# Patient Record
Sex: FEMALE | Race: OTHER | HISPANIC OR LATINO
[De-identification: names, ages, dates, MRNs, and addresses within clinical notes are randomized per-mention and may not be internally consistent; named-entity substitution may affect disease eponyms.]

---

## 2024-05-07 ENCOUNTER — APPOINTMENT (OUTPATIENT)
Dept: ANTEPARTUM | Facility: CLINIC | Age: 24
End: 2024-05-07
Payer: SELF-PAY

## 2024-05-07 ENCOUNTER — RESULT REVIEW (OUTPATIENT)
Age: 24
End: 2024-05-07

## 2024-05-07 ENCOUNTER — TRANSCRIPTION ENCOUNTER (OUTPATIENT)
Age: 24
End: 2024-05-07

## 2024-05-07 ENCOUNTER — EMERGENCY (EMERGENCY)
Facility: HOSPITAL | Age: 24
LOS: 1 days | Discharge: STILL ADMITTED OR ADMINISTRATIVE | End: 2024-05-07
Admitting: EMERGENCY MEDICINE
Payer: SELF-PAY

## 2024-05-07 ENCOUNTER — ASOB RESULT (OUTPATIENT)
Age: 24
End: 2024-05-07

## 2024-05-07 ENCOUNTER — INPATIENT (INPATIENT)
Facility: HOSPITAL | Age: 24
LOS: 2 days | Discharge: HOME | End: 2024-05-10
Attending: SPECIALIST | Admitting: SPECIALIST
Payer: MEDICAID

## 2024-05-07 VITALS
RESPIRATION RATE: 18 BRPM | TEMPERATURE: 98 F | OXYGEN SATURATION: 98 % | DIASTOLIC BLOOD PRESSURE: 115 MMHG | HEART RATE: 115 BPM | SYSTOLIC BLOOD PRESSURE: 116 MMHG

## 2024-05-07 VITALS — RESPIRATION RATE: 16 BRPM | TEMPERATURE: 99 F

## 2024-05-07 DIAGNOSIS — Z98.891 HISTORY OF UTERINE SCAR FROM PREVIOUS SURGERY: Chronic | ICD-10-CM

## 2024-05-07 DIAGNOSIS — O26.899 OTHER SPECIFIED PREGNANCY RELATED CONDITIONS, UNSPECIFIED TRIMESTER: ICD-10-CM

## 2024-05-07 LAB
ADD ON TEST-SPECIMEN IN LAB: SIGNIFICANT CHANGE UP
ALBUMIN SERPL ELPH-MCNC: 3.6 G/DL — SIGNIFICANT CHANGE UP (ref 3.3–5)
ALP SERPL-CCNC: 177 U/L — HIGH (ref 40–120)
ALT FLD-CCNC: 17 U/L — SIGNIFICANT CHANGE UP (ref 4–33)
ANION GAP SERPL CALC-SCNC: 16 MMOL/L — HIGH (ref 7–14)
APPEARANCE UR: ABNORMAL
AST SERPL-CCNC: 38 U/L — HIGH (ref 4–32)
BACTERIA # UR AUTO: ABNORMAL /HPF
BASOPHILS # BLD AUTO: 0.02 K/UL — SIGNIFICANT CHANGE UP (ref 0–0.2)
BASOPHILS NFR BLD AUTO: 0.2 % — SIGNIFICANT CHANGE UP (ref 0–2)
BILIRUB SERPL-MCNC: 0.4 MG/DL — SIGNIFICANT CHANGE UP (ref 0.2–1.2)
BILIRUB UR-MCNC: NEGATIVE — SIGNIFICANT CHANGE UP
BLD GP AB SCN SERPL QL: NEGATIVE — SIGNIFICANT CHANGE UP
BUN SERPL-MCNC: 8 MG/DL — SIGNIFICANT CHANGE UP (ref 7–23)
CALCIUM SERPL-MCNC: 8.7 MG/DL — SIGNIFICANT CHANGE UP (ref 8.4–10.5)
CHLORIDE SERPL-SCNC: 102 MMOL/L — SIGNIFICANT CHANGE UP (ref 98–107)
CO2 SERPL-SCNC: 16 MMOL/L — LOW (ref 22–31)
COLOR SPEC: YELLOW — SIGNIFICANT CHANGE UP
CREAT ?TM UR-MCNC: 150 MG/DL — SIGNIFICANT CHANGE UP
CREAT SERPL-MCNC: 0.44 MG/DL — LOW (ref 0.5–1.3)
DIFF PNL FLD: NEGATIVE — SIGNIFICANT CHANGE UP
EGFR: 139 ML/MIN/1.73M2 — SIGNIFICANT CHANGE UP
EOSINOPHIL # BLD AUTO: 0.09 K/UL — SIGNIFICANT CHANGE UP (ref 0–0.5)
EOSINOPHIL NFR BLD AUTO: 0.8 % — SIGNIFICANT CHANGE UP (ref 0–6)
GLUCOSE SERPL-MCNC: 159 MG/DL — HIGH (ref 70–99)
GLUCOSE UR QL: 100 MG/DL
HCT VFR BLD CALC: 33 % — LOW (ref 34.5–45)
HCV AB S/CO SERPL IA: 0.17 S/CO — SIGNIFICANT CHANGE UP (ref 0–0.99)
HCV AB SERPL-IMP: SIGNIFICANT CHANGE UP
HGB BLD-MCNC: 10.9 G/DL — LOW (ref 11.5–15.5)
HIV 1+2 AB+HIV1 P24 AG SERPL QL IA: SIGNIFICANT CHANGE UP
IANC: 8.13 K/UL — HIGH (ref 1.8–7.4)
IMM GRANULOCYTES NFR BLD AUTO: 0.5 % — SIGNIFICANT CHANGE UP (ref 0–0.9)
KETONES UR-MCNC: 15 MG/DL
LEUKOCYTE ESTERASE UR-ACNC: NEGATIVE — SIGNIFICANT CHANGE UP
LYMPHOCYTES # BLD AUTO: 2.63 K/UL — SIGNIFICANT CHANGE UP (ref 1–3.3)
LYMPHOCYTES # BLD AUTO: 22.6 % — SIGNIFICANT CHANGE UP (ref 13–44)
MAGNESIUM SERPL-MCNC: 3.6 MG/DL — HIGH (ref 1.6–2.6)
MCHC RBC-ENTMCNC: 25.1 PG — LOW (ref 27–34)
MCHC RBC-ENTMCNC: 33 GM/DL — SIGNIFICANT CHANGE UP (ref 32–36)
MCV RBC AUTO: 76 FL — LOW (ref 80–100)
MONOCYTES # BLD AUTO: 0.72 K/UL — SIGNIFICANT CHANGE UP (ref 0–0.9)
MONOCYTES NFR BLD AUTO: 6.2 % — SIGNIFICANT CHANGE UP (ref 2–14)
NEUTROPHILS # BLD AUTO: 8.13 K/UL — HIGH (ref 1.8–7.4)
NEUTROPHILS NFR BLD AUTO: 69.7 % — SIGNIFICANT CHANGE UP (ref 43–77)
NITRITE UR-MCNC: NEGATIVE — SIGNIFICANT CHANGE UP
NRBC # BLD: 0 /100 WBCS — SIGNIFICANT CHANGE UP (ref 0–0)
NRBC # FLD: 0 K/UL — SIGNIFICANT CHANGE UP (ref 0–0)
PH UR: 6 — SIGNIFICANT CHANGE UP (ref 5–8)
PLATELET # BLD AUTO: 199 K/UL — SIGNIFICANT CHANGE UP (ref 150–400)
POTASSIUM SERPL-MCNC: 4.1 MMOL/L — SIGNIFICANT CHANGE UP (ref 3.5–5.3)
POTASSIUM SERPL-SCNC: 4.1 MMOL/L — SIGNIFICANT CHANGE UP (ref 3.5–5.3)
PROT ?TM UR-MCNC: 117 MG/DL — SIGNIFICANT CHANGE UP
PROT SERPL-MCNC: 7.6 G/DL — SIGNIFICANT CHANGE UP (ref 6–8.3)
PROT UR-MCNC: 100 MG/DL
PROT/CREAT UR-RTO: 0.8 RATIO — HIGH (ref 0–0.2)
RBC # BLD: 4.34 M/UL — SIGNIFICANT CHANGE UP (ref 3.8–5.2)
RBC # FLD: 14.8 % — HIGH (ref 10.3–14.5)
RBC CASTS # UR COMP ASSIST: 1 /HPF — SIGNIFICANT CHANGE UP (ref 0–4)
RH IG SCN BLD-IMP: POSITIVE — SIGNIFICANT CHANGE UP
RH IG SCN BLD-IMP: POSITIVE — SIGNIFICANT CHANGE UP
SODIUM SERPL-SCNC: 134 MMOL/L — LOW (ref 135–145)
SP GR SPEC: 1.03 — SIGNIFICANT CHANGE UP (ref 1–1.03)
SQUAMOUS # UR AUTO: 8 /HPF — HIGH (ref 0–5)
T PALLIDUM AB TITR SER: NEGATIVE — SIGNIFICANT CHANGE UP
URATE SERPL-MCNC: 4.7 MG/DL — SIGNIFICANT CHANGE UP (ref 2.5–7)
UROBILINOGEN FLD QL: 1 MG/DL — SIGNIFICANT CHANGE UP (ref 0.2–1)
WBC # BLD: 11.65 K/UL — HIGH (ref 3.8–10.5)
WBC # FLD AUTO: 11.65 K/UL — HIGH (ref 3.8–10.5)
WBC UR QL: 4 /HPF — SIGNIFICANT CHANGE UP (ref 0–5)

## 2024-05-07 PROCEDURE — 99202 OFFICE O/P NEW SF 15 MIN: CPT | Mod: 25

## 2024-05-07 PROCEDURE — 59025 FETAL NON-STRESS TEST: CPT | Mod: 26,59

## 2024-05-07 PROCEDURE — 99253 IP/OBS CNSLTJ NEW/EST LOW 45: CPT | Mod: 25

## 2024-05-07 PROCEDURE — L9996: CPT

## 2024-05-07 PROCEDURE — 59514 CESAREAN DELIVERY ONLY: CPT | Mod: U7,GC

## 2024-05-07 PROCEDURE — 88307 TISSUE EXAM BY PATHOLOGIST: CPT | Mod: 26

## 2024-05-07 PROCEDURE — 99221 1ST HOSP IP/OBS SF/LOW 40: CPT

## 2024-05-07 PROCEDURE — 76805 OB US >/= 14 WKS SNGL FETUS: CPT | Mod: 26

## 2024-05-07 PROCEDURE — 76819 FETAL BIOPHYS PROFIL W/O NST: CPT | Mod: 26

## 2024-05-07 RX ORDER — INFLUENZA VIRUS VACCINE 15; 15; 15; 15 UG/.5ML; UG/.5ML; UG/.5ML; UG/.5ML
0.5 SUSPENSION INTRAMUSCULAR ONCE
Refills: 0 | Status: DISCONTINUED | OUTPATIENT
Start: 2024-05-07 | End: 2024-05-10

## 2024-05-07 RX ORDER — CITRIC ACID/SODIUM CITRATE 300-500 MG
30 SOLUTION, ORAL ORAL ONCE
Refills: 0 | Status: COMPLETED | OUTPATIENT
Start: 2024-05-07 | End: 2024-05-07

## 2024-05-07 RX ORDER — MAGNESIUM HYDROXIDE 400 MG/1
30 TABLET, CHEWABLE ORAL
Refills: 0 | Status: DISCONTINUED | OUTPATIENT
Start: 2024-05-07 | End: 2024-05-10

## 2024-05-07 RX ORDER — ACETAMINOPHEN 500 MG
975 TABLET ORAL
Refills: 0 | Status: DISCONTINUED | OUTPATIENT
Start: 2024-05-07 | End: 2024-05-10

## 2024-05-07 RX ORDER — SIMETHICONE 80 MG/1
80 TABLET, CHEWABLE ORAL EVERY 4 HOURS
Refills: 0 | Status: DISCONTINUED | OUTPATIENT
Start: 2024-05-07 | End: 2024-05-10

## 2024-05-07 RX ORDER — MAGNESIUM SULFATE 500 MG/ML
4 VIAL (ML) INJECTION ONCE
Refills: 0 | Status: COMPLETED | OUTPATIENT
Start: 2024-05-07 | End: 2024-05-07

## 2024-05-07 RX ORDER — LANOLIN
1 OINTMENT (GRAM) TOPICAL EVERY 6 HOURS
Refills: 0 | Status: DISCONTINUED | OUTPATIENT
Start: 2024-05-07 | End: 2024-05-10

## 2024-05-07 RX ORDER — CHLORHEXIDINE GLUCONATE 213 G/1000ML
1 SOLUTION TOPICAL DAILY
Refills: 0 | Status: DISCONTINUED | OUTPATIENT
Start: 2024-05-07 | End: 2024-05-07

## 2024-05-07 RX ORDER — DIPHENHYDRAMINE HCL 50 MG
25 CAPSULE ORAL EVERY 6 HOURS
Refills: 0 | Status: DISCONTINUED | OUTPATIENT
Start: 2024-05-07 | End: 2024-05-10

## 2024-05-07 RX ORDER — MAGNESIUM SULFATE 500 MG/ML
2 VIAL (ML) INJECTION
Qty: 40 | Refills: 0 | Status: DISCONTINUED | OUTPATIENT
Start: 2024-05-07 | End: 2024-05-08

## 2024-05-07 RX ORDER — KETOROLAC TROMETHAMINE 30 MG/ML
30 SYRINGE (ML) INJECTION EVERY 6 HOURS
Refills: 0 | Status: DISCONTINUED | OUTPATIENT
Start: 2024-05-07 | End: 2024-05-08

## 2024-05-07 RX ORDER — SODIUM CHLORIDE 9 MG/ML
1000 INJECTION, SOLUTION INTRAVENOUS
Refills: 0 | Status: DISCONTINUED | OUTPATIENT
Start: 2024-05-07 | End: 2024-05-08

## 2024-05-07 RX ORDER — HEPARIN SODIUM 5000 [USP'U]/ML
5000 INJECTION INTRAVENOUS; SUBCUTANEOUS EVERY 12 HOURS
Refills: 0 | Status: DISCONTINUED | OUTPATIENT
Start: 2024-05-07 | End: 2024-05-10

## 2024-05-07 RX ORDER — FAMOTIDINE 10 MG/ML
20 INJECTION INTRAVENOUS ONCE
Refills: 0 | Status: COMPLETED | OUTPATIENT
Start: 2024-05-07 | End: 2024-05-07

## 2024-05-07 RX ORDER — ACETAMINOPHEN 500 MG
1000 TABLET ORAL ONCE
Refills: 0 | Status: DISCONTINUED | OUTPATIENT
Start: 2024-05-07 | End: 2024-05-10

## 2024-05-07 RX ORDER — ACETAMINOPHEN 500 MG
1000 TABLET ORAL ONCE
Refills: 0 | Status: COMPLETED | OUTPATIENT
Start: 2024-05-07 | End: 2024-05-07

## 2024-05-07 RX ORDER — OXYTOCIN 10 UNIT/ML
VIAL (ML) INJECTION
Qty: 20 | Refills: 0 | Status: COMPLETED | OUTPATIENT
Start: 2024-05-07 | End: 2024-05-07

## 2024-05-07 RX ORDER — OXYCODONE HYDROCHLORIDE 5 MG/1
5 TABLET ORAL ONCE
Refills: 0 | Status: DISCONTINUED | OUTPATIENT
Start: 2024-05-07 | End: 2024-05-10

## 2024-05-07 RX ORDER — IBUPROFEN 200 MG
600 TABLET ORAL EVERY 6 HOURS
Refills: 0 | Status: COMPLETED | OUTPATIENT
Start: 2024-05-07 | End: 2025-04-05

## 2024-05-07 RX ORDER — TETANUS TOXOID, REDUCED DIPHTHERIA TOXOID AND ACELLULAR PERTUSSIS VACCINE, ADSORBED 5; 2.5; 8; 8; 2.5 [IU]/.5ML; [IU]/.5ML; UG/.5ML; UG/.5ML; UG/.5ML
0.5 SUSPENSION INTRAMUSCULAR ONCE
Refills: 0 | Status: DISCONTINUED | OUTPATIENT
Start: 2024-05-07 | End: 2024-05-10

## 2024-05-07 RX ORDER — NIFEDIPINE 30 MG
30 TABLET, EXTENDED RELEASE 24 HR ORAL ONCE
Refills: 0 | Status: COMPLETED | OUTPATIENT
Start: 2024-05-07 | End: 2024-05-07

## 2024-05-07 RX ORDER — OXYTOCIN 10 UNIT/ML
16.67 VIAL (ML) INJECTION
Qty: 20 | Refills: 0 | Status: DISCONTINUED | OUTPATIENT
Start: 2024-05-07 | End: 2024-05-08

## 2024-05-07 RX ORDER — OXYCODONE HYDROCHLORIDE 5 MG/1
5 TABLET ORAL
Refills: 0 | Status: DISCONTINUED | OUTPATIENT
Start: 2024-05-07 | End: 2024-05-10

## 2024-05-07 RX ORDER — SODIUM CHLORIDE 9 MG/ML
1000 INJECTION, SOLUTION INTRAVENOUS
Refills: 0 | Status: DISCONTINUED | OUTPATIENT
Start: 2024-05-07 | End: 2024-05-07

## 2024-05-07 RX ORDER — LABETALOL HCL 100 MG
20 TABLET ORAL ONCE
Refills: 0 | Status: COMPLETED | OUTPATIENT
Start: 2024-05-07 | End: 2024-05-07

## 2024-05-07 RX ADMIN — Medication 50 MILLIUNIT(S)/MIN: at 21:21

## 2024-05-07 RX ADMIN — CHLORHEXIDINE GLUCONATE 1 APPLICATION(S): 213 SOLUTION TOPICAL at 13:38

## 2024-05-07 RX ADMIN — FAMOTIDINE 20 MILLIGRAM(S): 10 INJECTION INTRAVENOUS at 17:21

## 2024-05-07 RX ADMIN — Medication 30 MILLILITER(S): at 17:21

## 2024-05-07 RX ADMIN — SODIUM CHLORIDE 1000 MILLILITER(S): 9 INJECTION, SOLUTION INTRAVENOUS at 12:05

## 2024-05-07 RX ADMIN — Medication 20 MILLIGRAM(S): at 13:07

## 2024-05-07 RX ADMIN — Medication 50 GM/HR: at 19:29

## 2024-05-07 RX ADMIN — Medication 50 GM/HR: at 13:31

## 2024-05-07 RX ADMIN — Medication 300 GRAM(S): at 13:08

## 2024-05-07 RX ADMIN — Medication 50 GM/HR: at 22:26

## 2024-05-07 RX ADMIN — Medication 30 MILLIGRAM(S): at 14:31

## 2024-05-07 RX ADMIN — Medication 400 MILLIGRAM(S): at 21:21

## 2024-05-07 NOTE — OB RN PATIENT PROFILE - COMFORT/ACCEPTABLE PAIN LEVEL (0-10)
How Severe Is Your Skin Lesion?: mild
Has Your Skin Lesion Been Treated?: not been treated
Is This A New Presentation, Or A Follow-Up?: Skin Lesion
6

## 2024-05-07 NOTE — CHART NOTE - NSCHARTNOTEFT_GEN_A_CORE
OB     Assuming care of patient. P1 (Portuguese speaking) admitted with PIH/ Given severe preeclampsia at 36 weeks decision made to proceed with delivery. H/o C/S x1.     Plan  -continue magnesium for seizure ppx  -consent obtained for repeat C/S  -anesthesia and RN teams aware    N Khari-MD [place]

## 2024-05-07 NOTE — OB PROVIDER TRIAGE NOTE - NSOBPROVIDERNOTE_OBGYN_ALL_OB_FT
D/w Dr Lovell, Dr Boo [facility] labs, T&S, RPR, Hep C, expedited labs sent  ATU sonogram to determine EDC D/w Dr Lovell, Dr Haddad  [facility] labs, T&S, RPR, Hep C, expedited labs, A1C, urine toxicology sent  ATU sonogram to determine EDC    1200  ATU sonographer at bedside    1235  Dr Hazel, Dr Kennedy, Dr Haddad at bedside    This is a 24 year old  at 36.6 weeks gestational age admitted for sPEC    Plan discussed with Dr Escalante, Dr Haddad   Plan discussed with MFM- pt for rpt c/s  Labetalol 20 mg IVP given at 1305 for 2 severe range BPs  Procardia 30 mg XL q 24 hours  Accucheck 114 @ 1302  Magnesium Sulfate for seizure precautions   Huddle done with Dr Escalante, Dr Haddad, Anesthesia and charge RN- plan to await NPO status unless change in clinical status     Risks, benefits, alternatives, and possible complications have been discussed in detail with the patient in her native language. Pre-admission, admission, and post admission procedures and expectations were discussed in detail. All questions answered, all appropriate hospital consents were signed.     Informed consent was obtained. The following was discussed:    -  section

## 2024-05-07 NOTE — OB PROVIDER TRIAGE NOTE - NSHPPHYSICALEXAM_GEN_ALL_CORE
Vital Signs Last 24 Hrs  T(C): 37 (07 May 2024 11:36), Max: 37.0 (07 May 2024 11:26)  T(F): 98.6 (07 May 2024 11:36), Max: 98.6 (07 May 2024 11:26)  HR: 96 (07 May 2024 12:00) (96 - 126)  BP: 132/74 (07 May 2024 12:00) (116/115 - 160/97)  BP(mean): --  RR: 18 (07 May 2024 11:36) (16 - 18)  SpO2: 98% (07 May 2024 10:24) (98% - 98%)    A7O x3  CTAB  Abdomen: gravid, soft, nontender Vital Signs Last 24 Hrs  T(C): 37 (07 May 2024 11:36), Max: 37.0 (07 May 2024 11:26)  T(F): 98.6 (07 May 2024 11:36), Max: 98.6 (07 May 2024 11:26)  HR: 96 (07 May 2024 12:00) (96 - 126)  BP: 132/74 (07 May 2024 12:00) (116/115 - 160/97)  BP(mean): --  RR: 18 (07 May 2024 11:36) (16 - 18)  SpO2: 98% (07 May 2024 10:24) (98% - 98%)    A&O x3  CTAB  Abdomen: gravid, soft, nontender, no rebound, no guarding  TAS: by [facility]/ ATU sonographer  vtx, anterior placenta, KALI 30, edema noted on face, arms and chest, efw 90%, AC > 99% efw 3635g 8 lbs BPP 8/8, images saved in ASOB  NST: 145 baseline, minimal to moderate variability + accels, no decels, mild contractions q2-5 minutes pt does not feel, reactive NST  SVE- deferred  SSE- deferred

## 2024-05-07 NOTE — OB RN DELIVERY SUMMARY - NS_SEPSISRSKCALC_OBGYN_ALL_OB_FT
EOS calculated successfully. EOS Risk Factor: 0.5/1000 live births ([facility] national incidence); GA=36w6d; Temp=98.6; ROM=0.05; GBS='Unknown'; Antibiotics='No antibiotics or any antibiotics < 2 hrs prior to birth'

## 2024-05-07 NOTE — OB PROVIDER H&P - NSHPPHYSICALEXAM_GEN_ALL_CORE
Vital Signs Last 24 Hrs  T(C): 37 (07 May 2024 11:36), Max: 37.0 (07 May 2024 11:26)  T(F): 98.6 (07 May 2024 11:36), Max: 98.6 (07 May 2024 11:26)  HR: 96 (07 May 2024 12:00) (96 - 126)  BP: 132/74 (07 May 2024 12:00) (116/115 - 160/97)  BP(mean): --  RR: 18 (07 May 2024 11:36) (16 - 18)  SpO2: 98% (07 May 2024 10:24) (98% - 98%)    A&O x3  CTAB  Abdomen: gravid, soft, nontender, no rebound, no guarding  TAS: by [facility]/ ATU sonographer  vtx, anterior placenta, KALI 30, edema noted on face, arms and chest, efw 90%, AC > 99% efw 3635g 8 lbs BPP 8/8, images saved in ASOB  NST: 145 baseline, minimal to moderate variability + accels, no decels, mild contractions q2-5 minutes pt does not feel, reactive NST  SVE- deferred  SSE- deferred

## 2024-05-07 NOTE — OB RN INTRAOPERATIVE NOTE - NS_DRAINS_OBGYN_ALL_OB
Jelena from [employer] called to notify us that Jose's Briviact was returned to sender. They got a notification that he did miss a dose. They are resending it to him.      Jelena (Pharmacist)   [phone]  Ref #: [id number]     
No

## 2024-05-07 NOTE — OB PROVIDER H&P - ASSESSMENT
D/w Dr Lovell, Dr aHddad  [facility] labs, T&S, RPR, Hep C, expedited labs, A1C, urine toxicology sent  ATU sonogram to determine EDC    1200  ATU sonographer at bedside    1235  Dr Hazel, Dr Kennedy, Dr Haddad at bedside    This is a 24 year old  at 36.6 weeks gestational age admitted for sPEC    Plan discussed with Dr Escalante, Dr Haddad   Plan discussed with MFM- pt for rpt c/s  Labetalol 20 mg IVP given at 1305 for 2 severe range BPs  Procardia 30 mg XL q 24 hours  Accucheck 114 @ 1302  Magnesium Sulfate for seizure precautions   2nd IV placed for risk of PPH  2 units T&C on hold     Huddle done with Dr Escalante, Dr Haddad, Anesthesia and charge RN- plan to await NPO status unless change in clinical status     Risks, benefits, alternatives, and possible complications have been discussed in detail with the patient in her native language. Pre-admission, admission, and post admission procedures and expectations were discussed in detail. All questions answered, all appropriate hospital consents were signed.     Informed consent was obtained. The following was discussed:    -  section

## 2024-05-07 NOTE — OB RN INTRAOPERATIVE NOTE - NSSELHIDDEN_OBGYN_ALL_OB_FT
[NS_DeliveryAttending1_OBGYN_ALL_OB_FT:[id number]],[NS_DeliveryAssist1_OBGYN_ALL_OB_FT:[id number]],[NS_DeliveryRN_OBGYN_ALL_OB_FT:[id number]]

## 2024-05-07 NOTE — CHART NOTE - NSCHARTNOTEFT_GEN_A_CORE
MFM ATTENDING    Called to bedside by sonographer for concern for skin edema on sono.     23yo  at 36w6d (see dating below) came to triage to "check on the baby." States she found out she was pregnant 3 weeks ago, had a sono at that time which gave her a due date of 2024, and [place] the [place] ago.     denies headache / blurry vision / scotomata / ruq / epig pain.   denies vb/lof/ctx, reports normal fetal movements.     Issues:  1. dating - EBONI 2024 by patient report, which she says is based on a sono 3 weeks ago. Records not available for review.   2. h/o c/s x1 at 37 weeks in setting of preeclampsia and diabetes  3. no prenatal care  4. elevated BP with multiple in severe range though not yet received immediate release anti-hypertensives  5.  in ED - checked appx 1-2 hours after eating. No known dx of GDM or pregestational diabetes. A1c pending.   6. h/o preeclampsia - delivered at 37w in prior pregnancy  7. h/o macrosomia in prior pregnancy - baby 9lb at 37 weeks per patient report.     BPs 130s-160s/90s-110s  Comfortable  Abd obese soft gravid nontender no rebound / guarding  ATU scan: cephalic, EFW 3635g (95%) AC >99%, KALI 30cm.     FHT: 150 / minimal / no accels / no decels --> 145 / moderate / + accels / no decels  [place]: ctx q1-4 minutes        hct 33  plt 199  creatinine 0.44  ast/alt 38/17  whole blood glucose: 159    HBsAg, HCV, HIV, rubella IgG, syphilis screen, T&S, utox pending.   UA: 100 protein    A1c pending    A/P: 23yo  at 36w6d with preeclampsia with severe features  1. fetus - initially appeared category 2 but later variability improved with accels. would keep on continuous monitoring. EFW 3635g, cephalic.     2. PEC with severe features - delivery is indicated. she is not well dated but she is 36w6d by stated EBONI from sono several weeks ago [place], and is measuring 37w3d on ATU scan today.   bp's normal to intermittently severe range. asymptomatic. labs mostly wnl except AST 38.   Would initiate nifedipine 30mg XL, magnesium, and delivery is indicated. patient has a prior  x1 and desires repeat . Ate juice and cake or biscuit at appx 8:30am. NPO status for 8 hours would be at appx 4:30pm, however would take for repeat  earlier if tracing remains with minimal variability or has decelerations.     3. likely dm - no clear diagnosis however given her history of a prior 37 week delivery of a 9lb baby with diabetes in that pregnancy, and now with finding of skin edema on sono and FSG of 179, suspect she has undiagnosed diabetes this pregnancy as well. No evidence of cardiomegaly or hydrops, denies recent illness. No obvious structural anomalies though scan is limited by gestational age and fetal position.   follow up fsg in 4 hours from last one. drip or subcutaneous insulin prn for glucose control.     All questions answered to patients satisfaction.     Discussed with  Dr. Escalante. MFM ATTENDING    Called to bedside by sonographer for concern for skin edema on sono.     25yo  at 36w6d (see dating below) came to triage to "check on the baby." States she found out she was pregnant 3 weeks ago, had a sono at that time which gave her a due date of 2024, and [place].     denies headache / blurry vision / scotomata / ruq / epig pain.   denies vb/lof/ctx, reports normal fetal movements.     Issues:  1. dating - EBONI 2024 by patient report, which she says is based on a sono 3 weeks ago. Records not available for review.   2. h/o c/s x1 at 37 weeks in setting of preeclampsia and diabetes  3. no prenatal care  4. elevated BP with multiple in severe range though not yet received immediate release anti-hypertensives  5.  in ED - checked appx 1-2 hours after eating. No known dx of GDM or pregestational diabetes. A1c pending.   6. h/o preeclampsia - delivered at 37w in prior pregnancy  7. h/o macrosomia in prior pregnancy - baby 9lb at 37 weeks per patient report.     BPs 130s-160s/90s-110s  Comfortable  Abd obese soft gravid nontender no rebound / guarding    SVE (triage staff): Closed / long / high    ATU scan: cephalic, EFW 3635g (95%) AC >99%, KALI 30cm.     FHT: 150 / minimal / no accels / no decels --> 145 / moderate / + accels / no decels  [place]: ctx q1-4 minutes        hct 33  plt 199  creatinine 0.44  ast/alt 38/17  whole blood glucose: 159    HBsAg, HCV, HIV, rubella IgG, syphilis screen, T&S, utox pending.   UA: 100 protein    A1c pending    A/P: 25yo  at 36w6d with preeclampsia with severe features  1. fetus - initially appeared category 2 but later variability improved with accels. would keep on continuous monitoring. EFW 3635g, cephalic.     2. PEC with severe features - delivery is indicated. she is not well dated but she is 36w6d by stated EBONI from sono several weeks ago [place], and is measuring 37w3d on ATU scan today.   bp's normal to intermittently severe range. asymptomatic. labs mostly wnl except AST 38.   Would initiate nifedipine 30mg XL, magnesium, and delivery is indicated. patient has a prior  x1 and desires repeat . Ate juice and cake or biscuit at appx 8:30am. NPO status for 8 hours would be at appx 4:30pm, however would take for repeat  earlier if tracing remains with minimal variability or has decelerations.     3. likely dm - no clear diagnosis however given her history of a prior 37 week delivery of a 9lb baby with diabetes in that pregnancy, and now with finding of skin edema on sono and FSG of 179, suspect she has undiagnosed diabetes this pregnancy as well. No evidence of cardiomegaly or hydrops, denies recent illness. No obvious structural anomalies though scan is limited by gestational age and fetal position.   follow up fsg in 4 hours from last one. drip or subcutaneous insulin prn for glucose control.     All questions answered to patients satisfaction.     Discussed with  Dr. Escalante.

## 2024-05-07 NOTE — OB NEONATOLOGY/PEDIATRICIAN DELIVERY SUMMARY - BABY A: APGAR 1 MIN MUSCLE TONE, DELIVERY
-- DO NOT REPLY / DO NOT REPLY ALL --  -- Message is from [employer] [facility] [employer] ([facility]) --    General Patient Message: Patient called stating multiple prescriptions for her atorvastatin was sent to [facility]. She would need all of the excess prescriptions canceled and leave one prescription for the 90 day refill active. Please assist.       Alternative phone number: [phone]     Can a detailed message be left? Yes    Message Turnaround: [place]:    Refer to site's KB page for routing instructions    Please give this turnaround time to the caller:   \"You can expect to receive a response 2-3 business days after your provider's clinical team reviews the message\"               (1) flexion of extremities

## 2024-05-07 NOTE — OB PROVIDER TRIAGE NOTE - HISTORY OF PRESENT ILLNESS
ID [id number] Yeyo    This is a 24 year old  at (?)36.6 weeks- pt states she found out she was pregnant 3 weeks ago in the [place] and was told at first sonogram/ OB appointment at that time that the EDC was  or " early ". Pt states last period was in 2023. Pt recently arrived in [place] (5/3) and presented to ED today with complaints of " feeling off and weak" and acid reflux. Pt denies headache, blurry vision, epigastric pain, nausea, vomiting or swelling. Pt denies elevated BPs at initial OB appointment. Pt reports +GFM, denies LOF, VB or contractions.     PNC: x 1 visit in [place] 3 weeks ago    AP course:  - previous c/s 2022 for PEC and GDM/LGA     HIE reviewed   no previous ultrasounds available for review   ID [id number] Yeyo    This is a 24 year old  at (questionable ?)36.6 weeks- pt states she found out she was pregnant 3 weeks ago in the [place] and was told at first sonogram/ OB appointment at that time that the EDC was  or " early ". Pt states last period was in 2023- does not remember exact date. Pt recently arrived in [place] (5/3) and presented to ED today with complaints of " feeling off and weak" and acid reflux. Pt denies headache, blurry vision, epigastric pain, nausea, vomiting or swelling. Pt denies elevated BPs at initial OB appointment. Pt reports +GFM, denies LOF, VB or contractions.     As per telephone report from ED RN at 10:23 BP was 166/115, 144/91 on quick-look assessment.    PNC: x 1 visit in [place] 3 weeks ago    AP course:  - previous c/s 2022 for PEC and GDM/LGA     HIE reviewed   no previous ultrasounds available for review

## 2024-05-07 NOTE — OB RN DELIVERY SUMMARY - NSSELHIDDEN_OBGYN_ALL_OB_FT
[NS_DeliveryAttending1_OBGYN_ALL_OB_FT:[id number]],[NS_DeliveryAssist1_OBGYN_ALL_OB_FT:[id number]],[NS_DeliveryRN_OBGYN_ALL_OB_FT:[id number]]

## 2024-05-07 NOTE — ED ADULT TRIAGE NOTE - CHIEF COMPLAINT QUOTE
approx. 9 months pregnant, wants evaluation, from the DR, +fetal movement, no bleeding/abdominal pain, noted to be hypertensive hx of preeclampsia and gestational diabetes

## 2024-05-07 NOTE — OB PROVIDER TRIAGE NOTE - NSHPLABSRESULTS_GEN_ALL_CORE
10.9   11.65 )-----------( 199      ( 07 May 2024 11:40 )             33.0         134<L>  |  102  |  8   ----------------------------<  159<H>  4.1   |  16<L>  |  0.44<L>    Ca    8.7      07 May 2024 11:40    TPro  7.6  /  Alb  3.6  /  TBili  0.4  /  DBili  x   /  AST  38<H>  /  ALT  17  /  AlkPhos  177<H>        Urinalysis Basic - ( 07 May 2024 11:40 )    Color: Yellow / Appearance: Cloudy / S.026 / pH: x  Gluc: 159 mg/dL / Ketone: 15 mg/dL  / Bili: Negative / Urobili: 1.0 mg/dL   Blood: x / Protein: 100 mg/dL / Nitrite: Negative   Leuk Esterase: Negative / RBC: 1 /HPF / WBC 4 /HPF   Sq Epi: x / Non Sq Epi: 8 /HPF / Bacteria: Many /HPF    PC ratio 0.8

## 2024-05-07 NOTE — OB PROVIDER H&P - HISTORY OF PRESENT ILLNESS
ID [id number] Yeyo    This is a 24 year old  at (questionable GA) 36.6 weeks- pt states she found out she was pregnant 3 weeks ago in the [place] and was told at first sonogram/ OB appointment at that time that the EDC was  or " early ". Pt states last period was in 2023- does not remember exact date. Pt recently arrived in [place] (5[place]3) and presented to ED today with complaints of " feeling off and weak" and acid reflux. Pt denies headache, blurry vision, epigastric pain, nausea, vomiting or swelling. Pt denies elevated BPs at initial OB appointment. Pt reports +GFM, denies LOF, VB or contractions.     As per telephone report from ED RN at 10:23 BP was 166/115, 144/91 on quick-look assessment.    PNC: x 1 visit in [place] 3 weeks ago    AP course:  - previous c/s 2022 for PEC and GDM/LGA     HIE reviewed   no previous ultrasounds available for review

## 2024-05-07 NOTE — OB RN PATIENT PROFILE - FALL HARM RISK - UNIVERSAL INTERVENTIONS
Bed in lowest position, wheels locked, appropriate side rails in place/Call bell, personal items and telephone in reach/Instruct patient to call for assistance before getting out of bed or chair/Non-slip footwear when patient is out of bed/[place] to call system/Physically safe environment - no spills, clutter or unnecessary equipment/Purposeful Proactive Rounding/Room/bathroom lighting operational, light cord in reach

## 2024-05-07 NOTE — OB RN PATIENT PROFILE - NS_PRENATALLABSOURCEGBS1PN_OBGYN_ALL_OB
called to say they received another shipment of antibiotics and yesterday Dr Beal said they were discontinuing that. Also he said there is an error on patient's AVS. He is asking to be called back.   unknown

## 2024-05-07 NOTE — OB PROVIDER DELIVERY SUMMARY - NSPROVIDERDELIVERYNOTE_OBGYN_ALL_OB_FT
rLTCS @36w6d for sPEC  Viable female infant, apgars 8/9, weight 3670g  Hysterotomy closed in 1 layer using caprosyn  Grossly normal uterus, tubes, and ovaries  Abdomen closed in standard fashion  Pt and infant to recovery in stable condition  Magnesium paused for  section. Resume in PACU.  QBL: 216   IVF: 2200    UOP: 400 repeat vacuum assisted LTCS @36w6d for sPEC  Viable female infant, apgars 8/9, weight 3670g  Hysterotomy closed in 1 layer using caprosyn  Grossly normal uterus, tubes, and ovaries  Abdomen closed in standard fashion  Pt and infant to recovery in stable condition  Magnesium paused for  section. To resume in PACU.  QBL: 216   IVF: 2200    UOP: 400

## 2024-05-07 NOTE — OB NEONATOLOGY/PEDIATRICIAN DELIVERY SUMMARY - NS_BIRTHTRAUMADETAILSA_OBGYN_ALL_OB_FT
Peds called to OR for no prenatal care. 36+6 wk LGA female born via rCS to a  22y/o  mother.  Prenatal history significant for GDMA1 no prenatal care and no prenatal labs. No other maternal labs or history. Mother denies feelin or being sick during pregnancy. Maternal labs include Blood Type A+ , HIV - , RPR PEND , Rubella PEND , Hep B PEND , GBS unknown. AROM at delivery with clear fluids. Baby emerged vigorous, crying, was warmed, dried suctioned and stimulated with APGARS of 8/9 . Resuscitation included: deep sutioning, stim, CPAP 5/35% weaned to CPAP 5/21% for 20 min starting at 5 MOL. Mom plans to initiate breastfeeding, declines Hep B vaccine.  Highest maternal temp: 36.8  . EOS0.06 .    BW 3670g

## 2024-05-07 NOTE — OB PROVIDER DELIVERY SUMMARY - NSSELHIDDEN_OBGYN_ALL_OB_FT
[NS_DeliveryAttending1_OBGYN_ALL_OB_FT:[id number]==],[NS_DeliveryAssist1_OBGYN_ALL_OB_FT:[id number]],[NS_DeliveryRN_OBGYN_ALL_OB_FT:[email or web address]]

## 2024-05-07 NOTE — OB RN DELIVERY SUMMARY - NS_BABYDISPOA_OBGYN_ALL_OB
Prescription for Ranexa 50 mg twice a day refused due to dose adjustment already sent   Mother's Bedside/Non-

## 2024-05-08 ENCOUNTER — TRANSCRIPTION ENCOUNTER (OUTPATIENT)
Age: 24
End: 2024-05-08

## 2024-05-08 DIAGNOSIS — E78.5 HYPERLIPIDEMIA, UNSPECIFIED: ICD-10-CM

## 2024-05-08 DIAGNOSIS — E11.9 TYPE 2 DIABETES MELLITUS WITHOUT COMPLICATIONS: ICD-10-CM

## 2024-05-08 DIAGNOSIS — O24.419 GESTATIONAL DIABETES MELLITUS IN PREGNANCY, UNSPECIFIED CONTROL: ICD-10-CM

## 2024-05-08 DIAGNOSIS — I10 ESSENTIAL (PRIMARY) HYPERTENSION: ICD-10-CM

## 2024-05-08 LAB
ALBUMIN SERPL ELPH-MCNC: 3.1 G/DL — LOW (ref 3.3–5)
ALP SERPL-CCNC: 168 U/L — HIGH (ref 40–120)
ALT FLD-CCNC: 14 U/L — SIGNIFICANT CHANGE UP (ref 4–33)
ANION GAP SERPL CALC-SCNC: 15 MMOL/L — HIGH (ref 7–14)
AST SERPL-CCNC: 37 U/L — HIGH (ref 4–32)
BASOPHILS # BLD AUTO: 0.02 K/UL — SIGNIFICANT CHANGE UP (ref 0–0.2)
BASOPHILS NFR BLD AUTO: 0.1 % — SIGNIFICANT CHANGE UP (ref 0–2)
BILIRUB SERPL-MCNC: 0.6 MG/DL — SIGNIFICANT CHANGE UP (ref 0.2–1.2)
BUN SERPL-MCNC: 7 MG/DL — SIGNIFICANT CHANGE UP (ref 7–23)
CALCIUM SERPL-MCNC: 7.7 MG/DL — LOW (ref 8.4–10.5)
CHLORIDE SERPL-SCNC: 99 MMOL/L — SIGNIFICANT CHANGE UP (ref 98–107)
CO2 SERPL-SCNC: 17 MMOL/L — LOW (ref 22–31)
CREAT SERPL-MCNC: 0.51 MG/DL — SIGNIFICANT CHANGE UP (ref 0.5–1.3)
EGFR: 134 ML/MIN/1.73M2 — SIGNIFICANT CHANGE UP
EOSINOPHIL # BLD AUTO: 0 K/UL — SIGNIFICANT CHANGE UP (ref 0–0.5)
EOSINOPHIL NFR BLD AUTO: 0 % — SIGNIFICANT CHANGE UP (ref 0–6)
GLUCOSE SERPL-MCNC: 181 MG/DL — HIGH (ref 70–99)
HBV SURFACE AG SERPL QL IA: SIGNIFICANT CHANGE UP
HCT VFR BLD CALC: 31.5 % — LOW (ref 34.5–45)
HGB BLD-MCNC: 10 G/DL — LOW (ref 11.5–15.5)
IANC: 12.29 K/UL — HIGH (ref 1.8–7.4)
IMM GRANULOCYTES NFR BLD AUTO: 0.3 % — SIGNIFICANT CHANGE UP (ref 0–0.9)
LDH SERPL L TO P-CCNC: 244 U/L — HIGH (ref 135–225)
LYMPHOCYTES # BLD AUTO: 1.67 K/UL — SIGNIFICANT CHANGE UP (ref 1–3.3)
LYMPHOCYTES # BLD AUTO: 11 % — LOW (ref 13–44)
MAGNESIUM SERPL-MCNC: 3.7 MG/DL — HIGH (ref 1.6–2.6)
MAGNESIUM SERPL-MCNC: 4.1 MG/DL — HIGH (ref 1.6–2.6)
MAGNESIUM SERPL-MCNC: 4.2 MG/DL — HIGH (ref 1.6–2.6)
MCHC RBC-ENTMCNC: 24.6 PG — LOW (ref 27–34)
MCHC RBC-ENTMCNC: 31.7 GM/DL — LOW (ref 32–36)
MCV RBC AUTO: 77.4 FL — LOW (ref 80–100)
MONOCYTES # BLD AUTO: 1.14 K/UL — HIGH (ref 0–0.9)
MONOCYTES NFR BLD AUTO: 7.5 % — SIGNIFICANT CHANGE UP (ref 2–14)
NEUTROPHILS # BLD AUTO: 12.29 K/UL — HIGH (ref 1.8–7.4)
NEUTROPHILS NFR BLD AUTO: 81.1 % — HIGH (ref 43–77)
NRBC # BLD: 0 /100 WBCS — SIGNIFICANT CHANGE UP (ref 0–0)
NRBC # FLD: 0 K/UL — SIGNIFICANT CHANGE UP (ref 0–0)
PLATELET # BLD AUTO: 215 K/UL — SIGNIFICANT CHANGE UP (ref 150–400)
POTASSIUM SERPL-MCNC: 4.6 MMOL/L — SIGNIFICANT CHANGE UP (ref 3.5–5.3)
POTASSIUM SERPL-SCNC: 4.6 MMOL/L — SIGNIFICANT CHANGE UP (ref 3.5–5.3)
PROT SERPL-MCNC: 6.4 G/DL — SIGNIFICANT CHANGE UP (ref 6–8.3)
RBC # BLD: 4.07 M/UL — SIGNIFICANT CHANGE UP (ref 3.8–5.2)
RBC # FLD: 14.4 % — SIGNIFICANT CHANGE UP (ref 10.3–14.5)
RUBV IGG SER-ACNC: 7 INDEX — SIGNIFICANT CHANGE UP
RUBV IGG SER-IMP: POSITIVE — SIGNIFICANT CHANGE UP
SODIUM SERPL-SCNC: 131 MMOL/L — LOW (ref 135–145)
URATE SERPL-MCNC: 4.9 MG/DL — SIGNIFICANT CHANGE UP (ref 2.5–7)
WBC # BLD: 15.17 K/UL — HIGH (ref 3.8–10.5)
WBC # FLD AUTO: 15.17 K/UL — HIGH (ref 3.8–10.5)

## 2024-05-08 PROCEDURE — 99254 IP/OBS CNSLTJ NEW/EST MOD 60: CPT

## 2024-05-08 RX ORDER — SODIUM CHLORIDE 9 MG/ML
1000 INJECTION, SOLUTION INTRAVENOUS
Refills: 0 | Status: DISCONTINUED | OUTPATIENT
Start: 2024-05-08 | End: 2024-05-10

## 2024-05-08 RX ORDER — METFORMIN HYDROCHLORIDE 850 MG/1
500 TABLET ORAL
Refills: 0 | Status: DISCONTINUED | OUTPATIENT
Start: 2024-05-08 | End: 2024-05-10

## 2024-05-08 RX ORDER — SENNA PLUS 8.6 MG/1
2 TABLET ORAL AT BEDTIME
Refills: 0 | Status: DISCONTINUED | OUTPATIENT
Start: 2024-05-08 | End: 2024-05-10

## 2024-05-08 RX ORDER — NIFEDIPINE 30 MG
10 TABLET, EXTENDED RELEASE 24 HR ORAL ONCE
Refills: 0 | Status: COMPLETED | OUTPATIENT
Start: 2024-05-08 | End: 2024-05-08

## 2024-05-08 RX ORDER — DIPHENHYDRAMINE HCL 50 MG
25 CAPSULE ORAL ONCE
Refills: 0 | Status: COMPLETED | OUTPATIENT
Start: 2024-05-08 | End: 2024-05-08

## 2024-05-08 RX ORDER — FERROUS SULFATE 325(65) MG
325 TABLET ORAL DAILY
Refills: 0 | Status: DISCONTINUED | OUTPATIENT
Start: 2024-05-08 | End: 2024-05-10

## 2024-05-08 RX ORDER — METFORMIN HYDROCHLORIDE 850 MG/1
1 TABLET ORAL
Qty: 180 | Refills: 0
Start: 2024-05-08 | End: 2024-08-05

## 2024-05-08 RX ORDER — SODIUM CHLORIDE 9 MG/ML
1000 INJECTION, SOLUTION INTRAVENOUS
Refills: 0 | Status: DISCONTINUED | OUTPATIENT
Start: 2024-05-08 | End: 2024-05-09

## 2024-05-08 RX ORDER — NIFEDIPINE 30 MG
1 TABLET, EXTENDED RELEASE 24 HR ORAL
Qty: 90 | Refills: 0
Start: 2024-05-08 | End: 2024-08-05

## 2024-05-08 RX ORDER — IBUPROFEN 200 MG
600 TABLET ORAL EVERY 6 HOURS
Refills: 0 | Status: DISCONTINUED | OUTPATIENT
Start: 2024-05-08 | End: 2024-05-10

## 2024-05-08 RX ORDER — OXYCODONE HYDROCHLORIDE 5 MG/1
5 TABLET ORAL ONCE
Refills: 0 | Status: DISCONTINUED | OUTPATIENT
Start: 2024-05-08 | End: 2024-05-08

## 2024-05-08 RX ORDER — NIFEDIPINE 30 MG
60 TABLET, EXTENDED RELEASE 24 HR ORAL DAILY
Refills: 0 | Status: DISCONTINUED | OUTPATIENT
Start: 2024-05-08 | End: 2024-05-10

## 2024-05-08 RX ORDER — INSULIN LISPRO 100/ML
VIAL (ML) SUBCUTANEOUS AT BEDTIME
Refills: 0 | Status: DISCONTINUED | OUTPATIENT
Start: 2024-05-08 | End: 2024-05-10

## 2024-05-08 RX ORDER — DEXTROSE 50 % IN WATER 50 %
25 SYRINGE (ML) INTRAVENOUS ONCE
Refills: 0 | Status: DISCONTINUED | OUTPATIENT
Start: 2024-05-08 | End: 2024-05-10

## 2024-05-08 RX ORDER — GLUCAGON INJECTION, SOLUTION 0.5 MG/.1ML
1 INJECTION, SOLUTION SUBCUTANEOUS ONCE
Refills: 0 | Status: DISCONTINUED | OUTPATIENT
Start: 2024-05-08 | End: 2024-05-10

## 2024-05-08 RX ORDER — DEXTROSE 10 % IN WATER 10 %
125 INTRAVENOUS SOLUTION INTRAVENOUS ONCE
Refills: 0 | Status: DISCONTINUED | OUTPATIENT
Start: 2024-05-08 | End: 2024-05-10

## 2024-05-08 RX ORDER — DEXTROSE 50 % IN WATER 50 %
12.5 SYRINGE (ML) INTRAVENOUS ONCE
Refills: 0 | Status: DISCONTINUED | OUTPATIENT
Start: 2024-05-08 | End: 2024-05-10

## 2024-05-08 RX ORDER — INSULIN LISPRO 100/ML
VIAL (ML) SUBCUTANEOUS
Refills: 0 | Status: DISCONTINUED | OUTPATIENT
Start: 2024-05-08 | End: 2024-05-10

## 2024-05-08 RX ORDER — DEXTROSE 50 % IN WATER 50 %
15 SYRINGE (ML) INTRAVENOUS ONCE
Refills: 0 | Status: DISCONTINUED | OUTPATIENT
Start: 2024-05-08 | End: 2024-05-10

## 2024-05-08 RX ORDER — NORETHINDRONE 0.35 MG/1
1 TABLET ORAL
Qty: 90 | Refills: 0
Start: 2024-05-08 | End: 2024-08-05

## 2024-05-08 RX ADMIN — Medication 30 MILLIGRAM(S): at 11:30

## 2024-05-08 RX ADMIN — HEPARIN SODIUM 5000 UNIT(S): 5000 INJECTION INTRAVENOUS; SUBCUTANEOUS at 05:45

## 2024-05-08 RX ADMIN — Medication 30 MILLIGRAM(S): at 05:44

## 2024-05-08 RX ADMIN — METFORMIN HYDROCHLORIDE 500 MILLIGRAM(S): 850 TABLET ORAL at 17:29

## 2024-05-08 RX ADMIN — Medication 975 MILLIGRAM(S): at 21:49

## 2024-05-08 RX ADMIN — Medication 60 MILLIGRAM(S): at 14:52

## 2024-05-08 RX ADMIN — Medication 10 MILLIGRAM(S): at 06:08

## 2024-05-08 RX ADMIN — Medication 1: at 08:27

## 2024-05-08 RX ADMIN — Medication 975 MILLIGRAM(S): at 14:52

## 2024-05-08 RX ADMIN — Medication 25 MILLIGRAM(S): at 01:23

## 2024-05-08 RX ADMIN — Medication 975 MILLIGRAM(S): at 08:59

## 2024-05-08 RX ADMIN — Medication 50 GM/HR: at 07:20

## 2024-05-08 RX ADMIN — Medication 30 MILLIGRAM(S): at 06:15

## 2024-05-08 RX ADMIN — Medication 30 MILLIGRAM(S): at 17:59

## 2024-05-08 RX ADMIN — HEPARIN SODIUM 5000 UNIT(S): 5000 INJECTION INTRAVENOUS; SUBCUTANEOUS at 17:29

## 2024-05-08 RX ADMIN — Medication 30 MILLIGRAM(S): at 11:00

## 2024-05-08 RX ADMIN — Medication 975 MILLIGRAM(S): at 08:29

## 2024-05-08 RX ADMIN — Medication 30 MILLIGRAM(S): at 00:58

## 2024-05-08 RX ADMIN — Medication 30 MILLIGRAM(S): at 17:29

## 2024-05-08 RX ADMIN — Medication 975 MILLIGRAM(S): at 15:18

## 2024-05-08 RX ADMIN — Medication 10 MILLIGRAM(S): at 03:08

## 2024-05-08 RX ADMIN — Medication 30 MILLIGRAM(S): at 01:43

## 2024-05-08 RX ADMIN — Medication 975 MILLIGRAM(S): at 22:36

## 2024-05-08 RX ADMIN — OXYCODONE HYDROCHLORIDE 5 MILLIGRAM(S): 5 TABLET ORAL at 20:34

## 2024-05-08 RX ADMIN — OXYCODONE HYDROCHLORIDE 5 MILLIGRAM(S): 5 TABLET ORAL at 19:54

## 2024-05-08 NOTE — DISCHARGE NOTE OB - NS MD DC FALL RISK RISK
For information on Fall & Injury Prevention, visit: [email or web address] OR  [email or web address] OR  https://www.cdc.gov/steadi/patient.html

## 2024-05-08 NOTE — CHART NOTE - NSCHARTNOTEFT_GEN_A_CORE
: [id number] (Nepali)      Went to patients bedside to discuss contraceptive options. Pt claims she has never used birth control before. She cannot remember her first period, but she gets her period regularly at the same time each month. She endorses stopping menstruating during this pregnancy but says she did not know she was pregnant until 3 weeks ago. Pt also does not endorse any history of PCOS, and has never had imaging of her uterus outside of pregnancies. No medical history outside of pregnancies. Only surgeries are 2  sections. On exam, no hirsutism noted.     Patient had initially spoke to me about Nexplanon earlier in the morning, but now is opting for pills. I counseled the patient that for best efficacy, pills should be taken at same time every day. Because patient is breastfeeding, I explained to the patient that typical combined-hormonal contraceptive pills could interfere with milk supply and therefore she will be prescribed progesterone-only pills during the postpartum period, after which she can be transitioned by the clinic to combined pills. Patient agrees to plan and has no other questions.       - POPs ordered to [facility]     Franko Kothari PGY1

## 2024-05-08 NOTE — DISCHARGE NOTE OB - CARE PLAN
1 Principal Discharge DX:	Status post repeat low transverse  section  Assessment and plan of treatment:	Make your follow-up appointment with your doctor as ordered. No heavy lifting, driving, or strenuous activity for 6 weeks. Nothing per vagina such as tampons, intercourse, douches or tub baths for 6 weeks or until you see your doctor. Call your doctor with any signs and symptoms of infection such as fever, chills, nausea or vomiting. Call your doctor if you’re unable to tolerate food, increase in vaginal bleeding, or have difficulty urinating. Call your doctor if you have pain that is not relieved by your prescribed medications. Notify your doctor with any other concerns. Call [phone] if you have any of these concerns in the next 6 weeks.    Please schedule appointments to see us in the Ob/Gyn clinic in 1 week for an incision check and in 6 weeks for a routine post partum visit.  Secondary Diagnosis:	Type 2 diabetes mellitus  Secondary Diagnosis:	Severe preeclampsia  Assessment and plan of treatment:	Please take your blood pressure 3 times a day and record on the supplied log. Please bring this log with you to your 1-week blood pressure check appointment. If the top number is ever higher than 140 or the bottom number higher than 90, please make an immediate appointment at the clinic. If the top number is ever higher than 160 or the bottom number higher than 110, please go to the emergency room. Please go to the emergency room if you start experiencing headaches, chest pain, shortness of breath, blurry vision, or upper right abdominal pain.     We have scheduled you an appointment with the OBGYN clinic on Monday 3pm for a blood pressure check. Please bring your blood pressure log and ID/passport to the visit.   Principal Discharge DX:	Status post repeat low transverse  section  Assessment and plan of treatment:	Make your follow-up appointment with your doctor as ordered. No heavy lifting, driving, or strenuous activity for 6 weeks. Nothing per vagina such as tampons, intercourse, douches or tub baths for 6 weeks or until you see your doctor. Call your doctor with any signs and symptoms of infection such as fever, chills, nausea or vomiting. Call your doctor if you’re unable to tolerate food, increase in vaginal bleeding, or have difficulty urinating. Call your doctor if you have pain that is not relieved by your prescribed medications. Notify your doctor with any other concerns. Call [phone] if you have any of these concerns in the next 6 weeks.    Please schedule appointments to see us in the Ob/Gyn clinic in 1 week for an incision check and in 6 weeks for a routine post partum visit.  Secondary Diagnosis:	Type 2 diabetes mellitus  Assessment and plan of treatment:	Please continue a carbohydrate-consistent diet in the outpatient setting. Please check your blood sugars in the morning when you wake up and at night before you sleep. We have sent a glucose meter and test strips to [facility]. Please take your Metformin 500mg 1 pill twice a day for the first week of postpartum, and then 2 pills twice a day after that. Please make an appointment with the Endocrinology, Ophthalmology, and Podiatry clinics for further followup within the next 2 weeks. The contact information is listed below.  Secondary Diagnosis:	Severe preeclampsia  Assessment and plan of treatment:	Please take your blood pressure 3 times a day and record on the supplied log. Please bring this log with you to your 1-week blood pressure check appointment. If the top number is ever higher than 140 or the bottom number higher than 90, please make an immediate appointment at the clinic. If the top number is ever higher than 160 or the bottom number higher than 110, please go to the emergency room. Please go to the emergency room if you start experiencing headaches, chest pain, shortness of breath, blurry vision, or upper right abdominal pain.     We have scheduled you an appointment with the OBGYN clinic on Monday 3pm for a blood pressure check. Please bring your blood pressure log and ID/passport to the visit.   Principal Discharge DX:	Status post repeat low transverse  section  Assessment and plan of treatment:	Make your follow-up appointment with your doctor as ordered. No heavy lifting, driving, or strenuous activity for 6 weeks. Nothing per vagina such as tampons, intercourse, douches or tub baths for 6 weeks or until you see your doctor. Call your doctor with any signs and symptoms of infection such as fever, chills, nausea or vomiting. Call your doctor if you’re unable to tolerate food, increase in vaginal bleeding, or have difficulty urinating. Call your doctor if you have pain that is not relieved by your prescribed medications. Notify your doctor with any other concerns. Call [phone] if you have any of these concerns in the next 6 weeks.    Please schedule appointments to see us in the Ob/Gyn clinic in 1 week for an incision check and in 6 weeks for a routine post partum visit.  Secondary Diagnosis:	Type 2 diabetes mellitus  Assessment and plan of treatment:	Please continue a carbohydrate-consistent diet in the outpatient setting. Please check your blood sugars in the morning when you wake up and at night before you sleep. We have sent a glucose meter and test strips to [facility]. Please take your Metformin 500mg 1 pill twice a day for the first week of postpartum, and then 2 pills twice a day after that. Please make an appointment with the Endocrinology, Ophthalmology, and Podiatry clinics for further followup within the next 2 weeks. The contact information is listed below.  Secondary Diagnosis:	Severe preeclampsia  Assessment and plan of treatment:	Please take your blood pressure 3 times a day and record on the supplied log. Please bring this log with you to your 1-week blood pressure check appointment. If the top number is ever higher than 140 or the bottom number higher than 90, please make an immediate appointment at the clinic. If the top number is ever higher than 160 or the bottom number higher than 110, please go to the emergency room. Please go to the emergency room if you start experiencing headaches, chest pain, shortness of breath, blurry vision, or upper right abdominal pain.     We have scheduled you an appointment with the OBGYN clinic on Monday 3pm for a blood pressure check. Please bring your blood pressure log and ID/passport to the visit.  Secondary Diagnosis:	Endometritis  Assessment and plan of treatment:	Call your doctor if you have signs of continued infection including fevers, chills, severe abdominal pain, nausea/vomiting that won't stop, and dizziness/lightheadedness. Make sure to stay hydrated. There is no need for further antibiotics as your infection was treated inpatient, but please call with any concerns regarding the infection.

## 2024-05-08 NOTE — DISCHARGE NOTE OB - ADDITIONAL INSTRUCTIONS
Instructions:  Make your follow-up appointment with your doctor as ordered.   No heavy lifting, driving, or strenuous activity for 6 weeks. Nothing per vagina such as tampons, intercourse, douches or tub baths for 6 weeks or until you see your doctor.   Call your doctor with any signs and symptoms of infection such as fever, chills, nausea or vomiting. Call your doctor with redness or swelling at the incision site, fluid leakage or wound separation. Call your doctor if you're unable to tolerate food, increase in vaginal bleeding, or have difficulty urinating. Call your doctor if you have pain that is not relieved by your prescribed medications. Notify your doctor with any of concerns.    PEC   Given a prescription for a blood pressure cuff to monitor your blood pressure at home. Call your doctor if your blood pressure is greater than or equal to 160 systolic (top number) or 110 diastolic (bottom number), or you experience a headache unrelieved by OTC medications, blurred vision, or difficulty breathing.    Follow up:    NIELS  Please f/u at the 1 week post-operative day for an incision check and for a postpartum appointment in 4-6 weeks @Ambulatory Clinic Unit, [facility], Oncology Building, basement floor. Please call the office for an appointment ([phone])

## 2024-05-08 NOTE — PROVIDER CONTACT NOTE (OTHER) - ASSESSMENT
Pt denies any HA, vision changes, or epigastric pain . Pt is tolerating the mag well. Pt states she feels "fine."

## 2024-05-08 NOTE — DISCHARGE NOTE OB - HOSPITAL COURSE
24y  G_P_ who experienced repeat LTCS at _ weeks & _ days. Procedure was uncomplicated. Postpartum course was unremarkable. Patient was transferred to postpartum floor & monitored. Pt was voiding spontaneously with normal vital signs. Patient is medically optimized for discharge & instructed to follow up with [facility] in 6 weeks for postpartum care.      discharge to home with home care (mother-baby). 24y   who experienced repeat LTCS at 36w6d. Patient recently immigrated from the [place], only learned she was pregnant 3 weeks ago. Dating performed by combination of inpatient ATU scan and patient's reported due date. On admission she was noted to have poorly controlled diabetes and also met blood pressure criteria for preeclampsia with severe features. Pt endorsed a history of preeclampsia without severe features and gestational diabetes A1 in last pregnancy, pt patient self-discontinued blood pressure medication and insulin shortly after delivery. She was started on magnesium infusion, Procardia 30XL, and insulin sliding scale. Patient underwent uncomplicated  section. Postpartum course was unremarkable. Patient was transferred to postpartum floor & monitored. Endocrinology was consulted, recommended the addition of inpatient and outpatient metformin with outpatient followup. Pt was voiding spontaneously with normal vital signs. Patient is medically optimized for discharge & instructed to follow up on Monday May 13th with the clinic for a blood pressure check and in 6 weeks with [facility] for postpartum care. Discharge to home. Declining home care (mother-baby). 24y   who experienced repeat LTCS at 36w6d. Patient recently immigrated from the [place], only learned she was pregnant 3 weeks ago. Dating performed by combination of inpatient ATU scan and patient's reported due date. On admission she was noted to have poorly controlled diabetes and also met blood pressure criteria for preeclampsia with severe features. Pt endorsed a history of preeclampsia without severe features and gestational diabetes A1 in last pregnancy, pt patient self-discontinued blood pressure medication and insulin shortly after delivery. She was started on magnesium infusion, Procardia 30XL, and insulin sliding scale. Patient underwent uncomplicated  section. Postpartum course was unremarkable. Patient was transferred to postpartum floor & monitored. Endocrinology was consulted, recommended the addition of inpatient and outpatient metformin with outpatient followup. Pt was voiding spontaneously with normal vital signs. Patient is medically optimized for discharge & instructed to follow up on Monday May 13th with the clinic for a blood pressure check and in 6 weeks with [facility] for postpartum care. Please also make appointments within the next 1-2 weeks with the ophthalmology clinic and podiatry clinic, contact information is listed below. Discharge to home. Declining home care (mother-baby). 24y   who experienced repeat LTCS at 36w6d. Patient recently immigrated from the [place], only learned she was pregnant 3 weeks ago. Dating performed by combination of inpatient ATU scan and patient's reported due date. On admission she was noted to have poorly controlled diabetes and also met blood pressure criteria for preeclampsia with severe features. Pt endorsed a history of preeclampsia without severe features and gestational diabetes A1 in last pregnancy, pt patient self-discontinued blood pressure medication and insulin shortly after delivery. She was started on magnesium infusion, Procardia 30XL, and insulin sliding scale. Patient underwent uncomplicated  section. Postpartum course was unremarkable. Patient was transferred to postpartum floor & monitored. Endocrinology was consulted, recommended the addition of inpatient and outpatient metformin with outpatient followup. On POD#2 patient showed signs of endometritis including fundal tenderness and leukocytosis. Zosyn was started at that time with resolution of symptoms. Pt was voiding spontaneously with normal vital signs. Patient is medically optimized for discharge & instructed to follow up on Monday May 13th with the clinic for a blood pressure check and in 6 weeks with [facility] for postpartum care. Please also make appointments within the next 1-2 weeks with the ophthalmology clinic and podiatry clinic, contact information is listed below. Discharge to home. Declining home care (mother-baby).

## 2024-05-08 NOTE — CONSULT NOTE ADULT - SUBJECTIVE AND OBJECTIVE BOX
NOTE INCOMPLETE/ IN PROGRESS  *Please wait for attending attestation for official recommendations.     HPI:  24 year old  at (questionable GA) 36.6 weeks- pt states she found out she was pregnant 3 weeks ago in the [place] and was told at first sonogram/ OB appointment at that time that the EDC was  or " early ". Pt states last period was in 2023- does not remember exact date. Pt recently arrived in [place] (5/3) and presented to ED today with complaints of " feeling off and weak" and acid reflux. Pt denies headache, blurry vision, epigastric pain, nausea, vomiting or swelling. In ED, BP was 166/115, 144/91. Admitted for suspected preeclampsia and underwent  .    Consulted for: Gestational vs pregestational diabetes, postpartum    Diabetes history:  Patient was first diagnosed with gestational diabetes during her first pregnancy in ; she was on a mixed insulin 70/30 6 units in the AM and 24 units in the evening. Her first pregnancy was complicated by preeclampsia and ended with . She reports that 2 weeks postpartum, she was told she does not have diabetes and was not continued on any medications.    She found out she was pregnant 3-4 weeks ago in [place], she emigrated to [place] on 5/3. She is now s/p  on . Per OB team, baby was large for gestational age suggesting uncontrolled diabetes. Patient was not taking any medications for diabetes during pregnancy. She denies polydipsia, polyuria, blurry vision, neuropathy during pregnancy.    A1c: 7.9% on admission (unreliable in pregnancy)    No plans for future pregnancy. She wants to start OCP or patch for birth control. She plans to breastfeed.    Family hx: T2DM in mother and father  Social hx: No alcohol, No smoking. Lives with mother in [place], [place].        PAST MEDICAL & SURGICAL HISTORY:  H/O  section          FAMILY HISTORY:  Mother father with T2DM    Social History:  no alcohol  no smoking    Outpatient Medications:  No diabetes medications    MEDICATIONS  (STANDING):  acetaminophen     Tablet .. 975 milliGRAM(s) Oral <User Schedule>  acetaminophen   IVPB .. 1000 milliGRAM(s) IV Intermittent once  dextrose 10% Bolus 125 milliLiter(s) IV Bolus once  dextrose 5%. 1000 milliLiter(s) (100 mL/Hr) IV Continuous <Continuous>  dextrose 5%. 1000 milliLiter(s) (50 mL/Hr) IV Continuous <Continuous>  dextrose 50% Injectable 25 Gram(s) IV Push once  dextrose 50% Injectable 12.5 Gram(s) IV Push once  diphtheria/tetanus/pertussis (acellular) Vaccine (Adacel) 0.5 milliLiter(s) IntraMuscular once  glucagon  Injectable 1 milliGRAM(s) IntraMuscular once  heparin   Injectable 5000 Unit(s) SubCutaneous every 12 hours  ibuprofen  Tablet. 600 milliGRAM(s) Oral every 6 hours  influenza   Vaccine 0.5 milliLiter(s) IntraMuscular once  insulin lispro (ADMELOG) corrective regimen sliding scale   SubCutaneous three times a day before meals  insulin lispro (ADMELOG) corrective regimen sliding scale   SubCutaneous at bedtime  ketorolac   Injectable 30 milliGRAM(s) IV Push every 6 hours  lactated ringers. 1000 milliLiter(s) (50 mL/Hr) IV Continuous <Continuous>  magnesium sulfate Infusion 2 Gm/Hr (50 mL/Hr) IV Continuous <Continuous>  NIFEdipine XL 60 milliGRAM(s) Oral daily  oxytocin Infusion 16.667 milliUNIT(s)/Min (50 mL/Hr) IV Continuous <Continuous>    MEDICATIONS  (PRN):  dextrose Oral Gel 15 Gram(s) Oral once PRN Blood Glucose LESS THAN 70 milliGRAM(s)/deciliter  diphenhydrAMINE 25 milliGRAM(s) Oral every 6 hours PRN Pruritus  lanolin Ointment 1 Application(s) Topical every 6 hours PRN Sore Nipples  magnesium hydroxide Suspension 30 milliLiter(s) Oral two times a day PRN Constipation  oxyCODONE    IR 5 milliGRAM(s) Oral once PRN Moderate to Severe Pain (4-10)  oxyCODONE    IR 5 milliGRAM(s) Oral every 3 hours PRN Moderate to Severe Pain (4-10)  simethicone 80 milliGRAM(s) Chew every 4 hours PRN Gas      Allergies    No Known Allergies    Intolerances      Review of Systems:  Constitutional: No fever  Eyes: No blurry vision  Neuro: No tremors  HEENT: No pain  Cardiovascular: No chest pain, palpitations  Respiratory: No SOB, no cough  GI: No nausea, vomiting. + abdominal pain  : No dysuria  Skin: no rash  Psych: no depression  Endocrine: no polyuria, polydipsia  Hem/lymph: no swelling  Osteoporosis: no fractures    ALL OTHER SYSTEMS REVIEWED AND NEGATIVE    PHYSICAL EXAM:  VITALS: T(C): 36.6 (24 @ 07:45)  T(F): 97.9 (24 @ 07:45), Max: 98.2 (24 @ 13:10)  HR: 103 (24 @ 09:45) (83 - 111)  BP: 132/84 (24 @ 09:45) (121/105 - 193/79)  RR:  (15 - 20)  SpO2:  (88% - 100%)  Wt(kg): --  GENERAL: NAD, obese  EYES: No proptosis, no lid lag, anicteric  HEENT: Atraumatic, Normocephalic, moist mucous membranes  RESPIRATORY: No respiratory distress. No wheezing  CARDIOVASCULAR: slightly tachycardic. No peripheral edema  GI: gravid abdomen. dressing on surgical site.  SKIN: Dry, intact, No rashes or lesions  MUSCULOSKELETAL: AGUSTIN  NEURO: extraocular movements intact, no tremor  PSYCH: Alert and oriented x 3, normal affect, normal mood  CUSHING'S SIGNS: no striae      CAPILLARY BLOOD GLUCOSE      POCT Blood Glucose.: 199 mg/dL (08 May 2024 07:31)  POCT Blood Glucose.: 196 mg/dL (08 May 2024 03:54)  POCT Blood Glucose.: 86 mg/dL (07 May 2024 17:25)  POCT Blood Glucose.: 114 mg/dL (07 May 2024 13:02)                            10.0   15.17 )-----------( 215      ( 08 May 2024 06:51 )             31.5           131<L>  |  99  |  7   ----------------------------<  181<H>  4.6   |  17<L>  |  0.51    eGFR: 134    Ca    7.7<L>        Mg     4.20         TPro  6.4  /  Alb  3.1<L>  /  TBili  0.6  /  DBili  x   /  AST  37<H>  /  ALT  14  /  AlkPhos  168<H>        Thyroid Function Tests:      A1C with Estimated Average Glucose Result: 7.9 % (24 @ 11:40)          Radiology:

## 2024-05-08 NOTE — DISCHARGE NOTE OB - PATIENT PORTAL LINK FT
You can access the FollowMyHealth Patient Portal offered by [facility] by registering at the following website: [email or web address]. By joining [employer]’s FollowMyHealth portal, you will also be able to view your health information using other applications (apps) compatible with our system.

## 2024-05-08 NOTE — DISCHARGE NOTE OB - CARE PROVIDER_API CALL
[facility],   [address] [place] [address] [place] [address]  Phone: [phone]  Fax: (   )    -  Scheduled Appointment: 05/13/2024 03:00 PM   [facility],   [address] [place] [address] [place] [address]  Phone: [phone]  Fax: (   )    -  Scheduled Appointment: 05/13/2024 03:00 PM    Endocrine Clinic at [facility],   [address] [place] [address]  Phone: [phone]  Fax: (   )    -  Follow Up Time: 1 week   [facility],   [address] [place] [address] [place] [address]  Phone: [phone]  Fax: (   )    -  Scheduled Appointment: 05/13/2024 03:00 PM    [facility],   [address] [place] [address]  Phone: [phone]  Fax: (   )    -  Follow Up Time: 1 week    [place], Joseph Cameron  Podiatric Medicine and Surgery  [address] [place] [id number]  Phone: [phone]  Fax: [phone]  Follow Up Time:

## 2024-05-08 NOTE — DISCHARGE NOTE OB - PROVIDER TOKENS
FREE:[LAST:[[facility]],PHONE:[phone]],FAX:[(   )    -],ADDRESS:[[address] [place] [address]],SCHEDULEDAPPT:[05/13/2024],SCHEDULEDAPPTTIME:[03:00 PM]] FREE:[LAST:[[facility]],PHONE:[phone]],FAX:[(   )    -],ADDRESS:[[address] [place] [address]],SCHEDULEDAPPT:[05/13/2024],SCHEDULEDAPPTTIME:[03:00 PM]],FREE:[LAST:[Endocrine Clinic at [facility]],PHONE:[phone]],FAX:[(   )    -],ADDRESS:[[address] [place] [address]],FOLLOWUP:[1 week]] FREE:[LAST:[[facility]],PHONE:[phone]],FAX:[(   )    -],ADDRESS:[[address] [place] [address]],SCHEDULEDAPPT:[05/13/2024],SCHEDULEDAPPTTIME:[03:00 PM]],FREE:[LAST:[Endocrine Clinic at [facility]],PHONE:[phone]],FAX:[(   )    -],ADDRESS:[[address] [place] [address]],FOLLOWUP:[1 week]],PROVIDER:[TOKEN:[[id number]]]

## 2024-05-08 NOTE — PROGRESS NOTE ADULT - ASSESSMENT
A/P: 25yo on POD#1 s/p rLTCS c/b preeclampsia with severe features and likely pregestational diabetes.  Patient is stable and doing well post-operatively.     #PEC with SF  - Severe range blood pressures overnight, requiring Procardia 10 IR x2  - On Procardia 60XL   - Cont Mg infusion, dc tonight at 6pm  - AST/ALT 38/17    #pregestational diabetes mellitus  - Pt learned she was pregnant 3 weeks prior, endorses history of GDMA2 and macrosomia in last pregnancy in [place] but did not take insulin after  - A1C 7.9  - on low dose insulin sliding scale   - continue fingersticks nightly and premeal  - Continue Carb consistent diet  - Endo consult placed, will await recs    #postpartum care   - Continue regular diet.  - Increase ambulation.  - HSQ, venodynes for DVT prophylaxis  - Continue motrin, tylenol, oxycodone PRN for pain control  - F/u AM CBC    Franko Kothari, PGY1   A/P: 23yo on POD#1 s/p rLTCS c/b preeclampsia with severe features and likely pregestational diabetes.  Patient is stable and doing well post-operatively.     #PEC with SF  - Severe range blood pressures overnight, requiring Procardia 10 IR x2  - Has received one dose of Procardia 30XL yesterday afternoon, will start Procardia 60mgXL today  - Cont Mg infusion, dc tonight at 6pm  - AST/ALT 38/17      #pregestational diabetes mellitus  - Pt learned she was pregnant 3 weeks prior, endorses history of GDMA2 and macrosomia in last pregnancy in [place] but did not take insulin after  - A1C 7.9  - on low dose insulin sliding scale   - continue fingersticks nightly and premeal  - Continue Carb consistent diet  - Endo consult placed, will await recs      #postpartum care   - Continue regular diet.  - Increase ambulation.  - HSQ, venodynes for DVT prophylaxis  - Continue motrin, tylenol, oxycodone PRN for pain control  - Hct: 33.0->31.5    Franko Kothari, PGY1

## 2024-05-08 NOTE — CONSULT NOTE ADULT - ASSESSMENT
24 year old  who presented at around 36.6 weeks (questionable GA), hx of gestational DM, suspected to have preeclampsia now s/p  on .  Endocrine consulted for gestational vs pregestational diabetes, postpartum.    #Gestational vs. Pregestational T2DM  Hx of gestational diabetes during her first pregnancy in ; she was on a mixed insulin 70/30 6 units in the AM and 24 units in the evening. Was told 2 weeks postpartum that she does not have diabetes and was not continued on any medications.    Given BG range and baby was large for gestational age suggests uncontrolled diabetes during pregnancy. Patient was not taking any medications for diabetes during pregnancy. She denies polydipsia, polyuria, blurry vision, neuropathy during pregnancy.  A1c: 7.9% on admission (unreliable in pregnancy)  Strong family hx of T2DM.    Post-c section BG has been elevated    Inpatient plan:  - Inpatient BG goal 100-180 now that patient is postpartum  - Recommend to start metformin 500mg BID with meals  - Low dose admelog correction scale TIDAC  - Low dose admelog correction scale QHS  - CC diet    Discharge plan:  - Discharge regimen: Continue metformin 500mg BID, uptitrate to 1000mg BID after 1 week  - Patient has no plans for future pregnancy. She wants to start OCP or patch for birth control. She plans to breastfeed. Once no longer breastfeeding, can consider GLP1 agonist outpatient for benefit of weight loss and DM control.  - Insurance: Medicaid  - Endocrine follow up: [facility]: [address] [place] [address];  # [phone]  - Routine ophthalmology and podiatry follow up    #HTN  - goal <130/80  - check urine albumin/cr ratio outpatient  - management by primary team    #HLD  - check lipid panel outpatient      Sam Mccallum MD  Endocrine Fellow  Can be reached via teams. For follow up questions, discharge recommendations, or new consults, please call answering service at [phone] (weekdays); [phone] (nights/weekends).

## 2024-05-08 NOTE — DISCHARGE NOTE OB - MEDICATION SUMMARY - MEDICATIONS TO TAKE
I will START or STAY ON the medications listed below when I get home from the hospital:    Lancet  -- Press device firmly to side of finger and use with test strips to measure blood sugar, in morning and before bedtime  -- Indication: For diabetes    Glucose Test Strips  -- Please use with glucometer to measure blood sugar in morning and before bedtime  -- Indication: For diabetes    Glucose meter  -- Please use to monitor fingerstick glucose levels at morning and bedtime.  -- Indication: For diabetes    Blood pressure cuff  -- Take blood pressure 3 times a day and record on log, bring with you to BP appointment  -- Indication: For preeclampsia with severe features    ibuprofen 600 mg oral tablet  -- 1 tab(s) by mouth every 6 hours  -- Indication: For pain    acetaminophen 325 mg oral tablet  -- 3 tab(s) by mouth every 6 hours  -- Indication: For pain    metFORMIN 500 mg oral tablet  -- 1 tab(s) by mouth 2 times a day For the first 7 days after delivery, take 1 pill twice a day. Starting on May 15th, start taking 2 pills twice a day  -- Indication: For diabetes    isopropyl alcohol 70% topical pad  -- Apply on skin to affected area 2 times a day Wipe finger prior to using lancet  -- Indication: For diabetes    NIFEdipine 60 mg oral tablet, extended release  -- 1 tab(s) by mouth once a day  -- Indication: For Preeclampsia with severe features     Prenatal Multivitamins with Folic Acid 1 mg oral tablet  -- 1 tab(s) by mouth once a day  -- Indication: For postpartum care    Deepti 0.35 mg oral tablet  -- 1 tab(s) by mouth once a day  -- Indication: For contraception

## 2024-05-08 NOTE — CHART NOTE - NSCHARTNOTEFT_GEN_A_CORE
Upon review of patient's chart, patient's A1c upon admission was 7.9.  Given patient's hx of GDMA2 on insulin and LGA baby in prior pregnancy with LGA baby in most recent pregnancy, pregestational diabetes cannot be ruled out at this time.  Decision made to start patient on low dose insulin sliding scale, carbohydrate consistent diet, and fingersticks nightly and premeal. Endocrine consulted.     Juan Manuel #[id number]    I explained to the patient that given her elevated HbA1C of 7.9 and history of GDMA2 (which puts her at risk of developing T2DM), we will be monitoring her blood sugars and have the endocrinology team evaluate her for possible pregestational diabetes. Upon further history taking the patient states that she took insulin throughout her first pregnancy and was told to continue her insulin after the birth of her first baby, but that she self discontinued the insulin 2-3 weeks postpartum. Patient and RN at bedside are understanding of this plan.    Trev Turpin PGY-4 and Dr. Sin ()  Pinky Mohr, PGY-1

## 2024-05-08 NOTE — CONSULT NOTE ADULT - ATTENDING COMMENTS
24 year old  who presented at around 36.6 weeks (questionable GA), hx of gestational DM, suspected to have preeclampsia now s/p  on .  Endocrine consulted for DM management. suspect DM2 based on history. Continue Low dose admelog scales, can start metformin if no contraindications.

## 2024-05-08 NOTE — CHART NOTE - NSCHARTNOTEFT_GEN_A_CORE
****Late Entry Due To Clinical Responsibilities****    Upon notifying RN regarding the clinical plan regarding the patient's possible pregestational diabetes, the RN reported a severe range blood pressure 160/91. I instructed the RN to get a 15 minute repeat blood pressure and immediately evaluated the patient at bedside.     Upon evaluation, the patient was resting comfortably in no acute distress. Denies headaches, blurry vision, nausea, vomiting, chest pain, SOB, RUQ pain, edema. The 15 minute repeat blood pressure was 155/91.  I informed the patient regarding the possible need for increasing her blood pressure medication if her blood pressures remain elevated. She expresses understanding. Plan initially was to administer Procardia 10IR, however by the time RN went to bedside to administer dose, repeat blood pressure was /77.  Decision made to hold Procardia 10IR at this time and continue monitoring blood pressures.   Continue Procardia 30XL daily.    D/w Trev Turpin PGY-4 and Dr. Sin ()  Pinky Mohr, PGY-1

## 2024-05-08 NOTE — DISCHARGE NOTE OB - PLAN OF CARE
Make your follow-up appointment with your doctor as ordered. No heavy lifting, driving, or strenuous activity for 6 weeks. Nothing per vagina such as tampons, intercourse, douches or tub baths for 6 weeks or until you see your doctor. Call your doctor with any signs and symptoms of infection such as fever, chills, nausea or vomiting. Call your doctor if you’re unable to tolerate food, increase in vaginal bleeding, or have difficulty urinating. Call your doctor if you have pain that is not relieved by your prescribed medications. Notify your doctor with any other concerns. Call [phone] if you have any of these concerns in the next 6 weeks.    Please schedule appointments to see us in the Ob/Gyn clinic in 1 week for an incision check and in 6 weeks for a routine post partum visit. Please take your blood pressure 3 times a day and record on the supplied log. Please bring this log with you to your 1-week blood pressure check appointment. If the top number is ever higher than 140 or the bottom number higher than 90, please make an immediate appointment at the clinic. If the top number is ever higher than 160 or the bottom number higher than 110, please go to the emergency room. Please go to the emergency room if you start experiencing headaches, chest pain, shortness of breath, blurry vision, or upper right abdominal pain.     We have scheduled you an appointment with the OBGYN clinic on Monday 3pm for a blood pressure check. Please bring your blood pressure log and ID/passport to the visit. Please continue a carbohydrate-consistent diet in the outpatient setting. Please check your blood sugars in the morning when you wake up and at night before you sleep. We have sent a glucose meter and test strips to [facility]. Please take your Metformin 500mg 1 pill twice a day for the first week of postpartum, and then 2 pills twice a day after that. Please make an appointment with the Endocrinology, Ophthalmology, and Podiatry clinics for further followup within the next 2 weeks. The contact information is listed below. Call your doctor if you have signs of continued infection including fevers, chills, severe abdominal pain, nausea/vomiting that won't stop, and dizziness/lightheadedness. Make sure to stay hydrated. There is no need for further antibiotics as your infection was treated inpatient, but please call with any concerns regarding the infection.

## 2024-05-08 NOTE — PROGRESS NOTE ADULT - SUBJECTIVE AND OBJECTIVE BOX
OB Progress Note:  Delivery, POD#1    S: 23yo POD#1 s/p rLTCS c/b preeclampsia with severe features and likely pregestational diabetes. Overnight, pt had 2 instances of severe range pressures requiring doses of Procardia 10mg. Her pain is well controlled. She is tolerating a regular diet and passing flatus. She is ambulating without difficulty. Voiding spontaneously. Denies chest pain, shortness of breath. Denies headaches, blurry vision, epigastric pain, weakness. Denies nausea/vomiting.    O:   Vital Signs Last 24 Hrs  T(C): 36.6 (08 May 2024 05:45), Max: 37.0 (07 May 2024 11:26)  T(F): 97.9 (08 May 2024 05:45), Max: 98.6 (07 May 2024 11:26)  HR: 102 (08 May 2024 05:45) (83 - 126)  BP: 156/85 (08 May 2024 05:45) (116/115 - 193/79)  BP(mean): 94 (07 May 2024 21:00) (86 - 109)  RR: 16 (08 May 2024 05:45) (15 - 20)  SpO2: 100% (08 May 2024 05:45) (88% - 100%)        Labs:  Blood type: A Positive  Rubella IgG: RPR: Negative                          10.9<L>   11.65<H> >-----------< 199    (  @ 11:40 )             33.0<L>    24 @ 11:40      134<L>  |  102  |  8   ----------------------------<  159<H>  4.1   |  16<L>  |  0.44<L>        Ca    8.7      07 May 2024 11:40  Mg     3.70<H>       Mg     3.60<H>         TPro  7.6  /  Alb  3.6  /  TBili  0.4  /  DBili  x   /  AST  38<H>  /  ALT  17  /  AlkPhos  177<H>  24 @ 11:40          PE:  General: NAD  Abdomen: Mildly distended, appropriately tender, incision c/d/i.  Extremities: No erythema, no pitting edema   OB Progress Note:  Delivery, POD#1    S: 23yo POD#1 s/p rLTCS at approx 36w6d c/b preeclampsia with severe features and likely pregestational diabetes. Overnight, pt had 2 instances of severe range pressures requiring doses of Procardia 10mg. Her pain is well controlled. She is tolerating a regular diet and passing flatus. She is ambulating without difficulty. Voiding spontaneously. Denies chest pain, shortness of breath. Denies headaches, blurry vision, epigastric pain, weakness. Denies nausea/vomiting.    O:   Vital Signs Last 24 Hrs  T(C): 36.6 (08 May 2024 05:45), Max: 37.0 (07 May 2024 11:26)  T(F): 97.9 (08 May 2024 05:45), Max: 98.6 (07 May 2024 11:26)  HR: 102 (08 May 2024 05:45) (83 - 126)  BP: 156/85 (08 May 2024 05:45) (116/115 - 193/79)  BP(mean): 94 (07 May 2024 21:00) (86 - 109)  RR: 16 (08 May 2024 05:45) (15 - 20)  SpO2: 100% (08 May 2024 05:45) (88% - 100%)        Labs:  Blood type: A Positive  Rubella IgG: RPR: Negative                          10.9<L>   11.65<H> >-----------< 199    (  @ 11:40 )             33.0<L>    - @ 11:40      134<L>  |  102  |  8   ----------------------------<  159<H>  4.1   |  16<L>  |  0.44<L>        Ca    8.7      07 May 2024 11:40  Mg     3.70<H>       Mg     3.60<H>         TPro  7.6  /  Alb  3.6  /  TBili  0.4  /  DBili  x   /  AST  38<H>  /  ALT  17  /  AlkPhos  177<H>  24 @ 11:40          PE:  General: NAD  Abdomen: Mildly distended, appropriately tender, incision c/d/i.  Extremities: No erythema, no pitting edema   OB Progress Note:  Delivery, POD#1    S: 25yo POD#1 s/p rLTCS at approx 36w6d c/b preeclampsia with severe features and likely pregestational diabetes. Overnight, pt had 2 instances of severe range pressures requiring doses of Procardia 10mg. Her pain is well controlled. She is tolerating a regular diet and passing flatus. She is ambulating without difficulty. Voiding spontaneously. Denies chest pain, shortness of breath. Denies headaches, blurry vision, epigastric pain, weakness. Denies nausea/vomiting.    O:   Vital Signs Last 24 Hrs  T(C): 36.6 (08 May 2024 05:45), Max: 37.0 (07 May 2024 11:26)  T(F): 97.9 (08 May 2024 05:45), Max: 98.6 (07 May 2024 11:26)  HR: 102 (08 May 2024 05:45) (83 - 126)   BP: 156/85 (08 May 2024 05:45) (116/115 - 193/79)  BP(mean): 94 (07 May 2024 21:00) (86 - 109)  RR: 16 (08 May 2024 05:45) (15 - 20)  SpO2: 100% (08 May 2024 05:45) (88% - 100%)        Labs:  Blood type: A Positive  Rubella IgG: RPR: Negative                          10.9<L>   11.65<H> >-----------< 199    (  @ 11:40 )             33.0<L>    - @ 11:40      134<L>  |  102  |  8   ----------------------------<  159<H>  4.1   |  16<L>  |  0.44<L>        Ca    8.7      07 May 2024 11:40  Mg     3.70<H>       Mg     3.60<H>         TPro  7.6  /  Alb  3.6  /  TBili  0.4  /  DBili  x   /  AST  38<H>  /  ALT  17  /  AlkPhos  177<H>  24 @ 11:40          PE:  General: NAD  Abdomen: Mildly distended, appropriately tender, incision c/d/i.  Extremities: No erythema, no pitting edema   OB Progress Note:  Delivery, POD#1    : Ronni #[id number] ([place])      S: 25yo POD#1 s/p rLTCS at approx 36w6d c/b preeclampsia with severe features and likely pregestational diabetes. Overnight, pt had 2 instances of severe range pressures requiring doses of Procardia 10mg. Her pain is well controlled. She is tolerating a regular diet and passing flatus. She is ambulating without difficulty. Voiding spontaneously. Denies chest pain, shortness of breath. Denies headaches, blurry vision, epigastric pain, weakness. Denies nausea/vomiting.    O:   Vital Signs Last 24 Hrs  T(C): 36.6 (08 May 2024 05:45), Max: 37.0 (07 May 2024 11:26)  T(F): 97.9 (08 May 2024 05:45), Max: 98.6 (07 May 2024 11:26)  HR: 102 (08 May 2024 05:45) (83 - 126)   BP: 156/85 (08 May 2024 05:45) (116/115 - 193/79)  BP(mean): 94 (07 May 2024 21:00) (86 - 109)  RR: 16 (08 May 2024 05:45) (15 - 20)  SpO2: 100% (08 May 2024 05:45) (88% - 100%)        Labs:  Blood type: A Positive  Rubella IgG: RPR: Negative                          10.9<L>   11.65<H> >-----------< 199    (  @ 11:40 )             33.0<L>    24 @ 11:40      134<L>  |  102  |  8   ----------------------------<  159<H>  4.1   |  16<L>  |  0.44<L>        Ca    8.7      07 May 2024 11:40  Mg     3.70<H>       Mg     3.60<H>         TPro  7.6  /  Alb  3.6  /  TBili  0.4  /  DBili  x   /  AST  38<H>  /  ALT  17  /  AlkPhos  177<H>  24 @ 11:40          PE:  General: NAD  Abdomen: Mildly distended, appropriately tender, incision c/d/i.  Extremities: No erythema, no pitting edema

## 2024-05-08 NOTE — PROVIDER CONTACT NOTE (OTHER) - BACKGROUND
CS from 5/7 @ 1820. PEC w/ SF on mag sulfate until 5/8 @ 1820. GDMA1. No prenatal care.  Procardia 30XL daily 1430.

## 2024-05-09 LAB
CULTURE RESULTS: SIGNIFICANT CHANGE UP
SPECIMEN SOURCE: SIGNIFICANT CHANGE UP

## 2024-05-09 PROCEDURE — 99232 SBSQ HOSP IP/OBS MODERATE 35: CPT

## 2024-05-09 RX ORDER — PIPERACILLIN AND TAZOBACTAM 4; .5 G/20ML; G/20ML
4.5 INJECTION, POWDER, LYOPHILIZED, FOR SOLUTION INTRAVENOUS EVERY 8 HOURS
Refills: 0 | Status: COMPLETED | OUTPATIENT
Start: 2024-05-09 | End: 2024-05-10

## 2024-05-09 RX ORDER — METFORMIN HYDROCHLORIDE 850 MG/1
1 TABLET ORAL
Qty: 210 | Refills: 0
Start: 2024-05-09

## 2024-05-09 RX ORDER — IBUPROFEN 200 MG
1 TABLET ORAL
Qty: 0 | Refills: 0 | DISCHARGE
Start: 2024-05-09

## 2024-05-09 RX ORDER — ACETAMINOPHEN 500 MG
3 TABLET ORAL
Qty: 0 | Refills: 0 | DISCHARGE
Start: 2024-05-09

## 2024-05-09 RX ADMIN — METFORMIN HYDROCHLORIDE 500 MILLIGRAM(S): 850 TABLET ORAL at 08:09

## 2024-05-09 RX ADMIN — Medication 975 MILLIGRAM(S): at 15:28

## 2024-05-09 RX ADMIN — Medication 975 MILLIGRAM(S): at 15:58

## 2024-05-09 RX ADMIN — Medication 975 MILLIGRAM(S): at 08:39

## 2024-05-09 RX ADMIN — Medication 600 MILLIGRAM(S): at 11:29

## 2024-05-09 RX ADMIN — Medication 1 TABLET(S): at 17:38

## 2024-05-09 RX ADMIN — METFORMIN HYDROCHLORIDE 500 MILLIGRAM(S): 850 TABLET ORAL at 17:38

## 2024-05-09 RX ADMIN — PIPERACILLIN AND TAZOBACTAM 200 GRAM(S): 4; .5 INJECTION, POWDER, LYOPHILIZED, FOR SOLUTION INTRAVENOUS at 11:36

## 2024-05-09 RX ADMIN — Medication 325 MILLIGRAM(S): at 17:38

## 2024-05-09 RX ADMIN — HEPARIN SODIUM 5000 UNIT(S): 5000 INJECTION INTRAVENOUS; SUBCUTANEOUS at 17:37

## 2024-05-09 RX ADMIN — PIPERACILLIN AND TAZOBACTAM 200 GRAM(S): 4; .5 INJECTION, POWDER, LYOPHILIZED, FOR SOLUTION INTRAVENOUS at 22:15

## 2024-05-09 RX ADMIN — Medication 600 MILLIGRAM(S): at 11:59

## 2024-05-09 RX ADMIN — Medication 600 MILLIGRAM(S): at 17:38

## 2024-05-09 RX ADMIN — Medication 975 MILLIGRAM(S): at 03:22

## 2024-05-09 RX ADMIN — Medication 60 MILLIGRAM(S): at 15:28

## 2024-05-09 RX ADMIN — Medication 600 MILLIGRAM(S): at 06:15

## 2024-05-09 RX ADMIN — Medication 600 MILLIGRAM(S): at 05:40

## 2024-05-09 RX ADMIN — Medication 975 MILLIGRAM(S): at 02:46

## 2024-05-09 RX ADMIN — Medication 600 MILLIGRAM(S): at 18:08

## 2024-05-09 RX ADMIN — Medication 600 MILLIGRAM(S): at 00:02

## 2024-05-09 RX ADMIN — Medication 600 MILLIGRAM(S): at 01:00

## 2024-05-09 RX ADMIN — Medication 975 MILLIGRAM(S): at 08:09

## 2024-05-09 RX ADMIN — HEPARIN SODIUM 5000 UNIT(S): 5000 INJECTION INTRAVENOUS; SUBCUTANEOUS at 05:41

## 2024-05-09 NOTE — PROGRESS NOTE ADULT - SUBJECTIVE AND OBJECTIVE BOX
Chief Complaint: Gestational DM, postpartum    History: saw pt at bedside, used  phone ID # [phone]. tolerating PO diet, no nausea, no abd pain. FS at goal. metformin 500mg BID started yesterday evening.     MEDICATIONS  (STANDING):  acetaminophen     Tablet .. 975 milliGRAM(s) Oral <User Schedule>  acetaminophen   IVPB .. 1000 milliGRAM(s) IV Intermittent once  dextrose 10% Bolus 125 milliLiter(s) IV Bolus once  dextrose 5%. 1000 milliLiter(s) (50 mL/Hr) IV Continuous <Continuous>  dextrose 5%. 1000 milliLiter(s) (100 mL/Hr) IV Continuous <Continuous>  dextrose 50% Injectable 12.5 Gram(s) IV Push once  dextrose 50% Injectable 25 Gram(s) IV Push once  diphtheria/tetanus/pertussis (acellular) Vaccine (Adacel) 0.5 milliLiter(s) IntraMuscular once  ferrous    sulfate 325 milliGRAM(s) Oral daily  glucagon  Injectable 1 milliGRAM(s) IntraMuscular once  heparin   Injectable 5000 Unit(s) SubCutaneous every 12 hours  ibuprofen  Tablet. 600 milliGRAM(s) Oral every 6 hours  influenza   Vaccine 0.5 milliLiter(s) IntraMuscular once  insulin lispro (ADMELOG) corrective regimen sliding scale   SubCutaneous three times a day before meals  insulin lispro (ADMELOG) corrective regimen sliding scale   SubCutaneous at bedtime  metFORMIN 500 milliGRAM(s) Oral two times a day with meals  NIFEdipine XL 60 milliGRAM(s) Oral daily  piperacillin/tazobactam IVPB.. 4.5 Gram(s) IV Intermittent every 8 hours  prenatal multivitamin 1 Tablet(s) Oral daily  senna 2 Tablet(s) Oral at bedtime    MEDICATIONS  (PRN):  dextrose Oral Gel 15 Gram(s) Oral once PRN Blood Glucose LESS THAN 70 milliGRAM(s)/deciliter  diphenhydrAMINE 25 milliGRAM(s) Oral every 6 hours PRN Pruritus  lanolin Ointment 1 Application(s) Topical every 6 hours PRN Sore Nipples  magnesium hydroxide Suspension 30 milliLiter(s) Oral two times a day PRN Constipation  oxyCODONE    IR 5 milliGRAM(s) Oral every 3 hours PRN Moderate to Severe Pain (4-10)  oxyCODONE    IR 5 milliGRAM(s) Oral once PRN Moderate to Severe Pain (4-10)  simethicone 80 milliGRAM(s) Chew every 4 hours PRN Gas      Allergies    No Known Allergies    Intolerances      Review of Systems:    ALL OTHER SYSTEMS REVIEWED AND NEGATIVE      PHYSICAL EXAM:  VITALS: T(C): 36.7 (05-09-24 @ 09:47)  T(F): 98.1 (05-09-24 @ 09:47), Max: 98.7 (05-09-24 @ 06:00)  HR: 105 (05-09-24 @ 09:47) (92 - 105)  BP: 131/64 (05-09-24 @ 09:47) (124/70 - 136/77)  RR:  (18 - 19)  SpO2:  (100% - 100%)  Wt(kg): --  GENERAL: NAD, well-groomed, well-developed  EYES: No proptosis  HEENT:  Atraumatic, Normocephalic  RESPIRATORY: non labored breathing   CARDIOVASCULAR: Regular rate and rhythm  GI: Soft, nontender, non distended  PSYCH: Alert and oriented x 3, normal affect, normal mood       CAPILLARY BLOOD GLUCOSE      POCT Blood Glucose.: 107 mg/dL (09 May 2024 11:52)  POCT Blood Glucose.: 124 mg/dL (09 May 2024 08:01)  POCT Blood Glucose.: 133 mg/dL (08 May 2024 22:01)  POCT Blood Glucose.: 139 mg/dL (08 May 2024 17:08)      05-08    131<L>  |  99  |  7   ----------------------------<  181<H>  4.6   |  17<L>  |  0.51    eGFR: 134    Ca    7.7<L>      05-08  Mg     4.10     05-08    TPro  6.4  /  Alb  3.1<L>  /  TBili  0.6  /  DBili  x   /  AST  37<H>  /  ALT  14  /  AlkPhos  168<H>  05-08          Thyroid Function Tests:        A1C with Estimated Average Glucose Result: 7.9 % (05-07-24 @ 11:40)          Diet, Consistent Carbohydrate/No Snacks (05-08-24 @ 01:18)

## 2024-05-09 NOTE — PROGRESS NOTE ADULT - ASSESSMENT
24 year old  who presented at around 36.6 weeks (questionable GA), hx of gestational DM, suspected to have preeclampsia now s/p  on .  Endocrine consulted for gestational vs pregestational diabetes, postpartum.    #Gestational vs. Pregestational T2DM  Hx of gestational diabetes during her first pregnancy in ; she was on a mixed insulin 70/30 6 units in the AM and 24 units in the evening. Was told 2 weeks postpartum that she does not have diabetes and was not continued on any medications.    Given BG range and baby was large for gestational age suggests uncontrolled diabetes during pregnancy. Patient was not taking any medications for diabetes during pregnancy. She denies polydipsia, polyuria, blurry vision, neuropathy during pregnancy.  A1c: 7.9% on admission (unreliable in pregnancy)  Strong family hx of T2DM.    Post-c section BG has been elevated    Inpatient plan:  - Inpatient BG goal 100-180 now that patient is postpartum  -  FS at goal today  - Continue metformin 500mg BID with meals  - Low dose admelog correction scale TIDAC  - Low dose admelog correction scale QHS  - CC diet    Discharge plan:  - Discharge regimen: Continue metformin 500mg BID, uptitrate to 1000mg BID after 1 week (metformin was started  in the evening).  -Please send script for glucometer and supplies: test strips, lancets, alcohol swabs. Pt should check her FS in the mornings prior to eating and keep track for her outpt provider follow up appt.   - Patient has no plans for future pregnancy. She wants to start OCP or patch for birth control. She plans to breastfeed. Once no longer breastfeeding, can consider GLP1 agonist outpatient for benefit of weight loss and DM control.  - Insurance: Medicaid  - Endocrine follow up: [facility]: [address] [place] [address]; Ph # [phone]  - Routine ophthalmology and podiatry follow up    #HTN  - goal <130/80  - check urine albumin/cr ratio outpatient  - management by primary team    #HLD  - check lipid panel outpatient    d/w primary team provider Franko Kothari.     [facility]-BC  Nurse Practitioner  Division of Endocrinology & Diabetes  pager [phone]   Can be reached via teams. For follow up questions, discharge recommendations, or new consults, please call answering service at [phone] (weekdays); [phone] (nights/weekends).

## 2024-05-09 NOTE — PROGRESS NOTE ADULT - ASSESSMENT
A/P: 25yo on POD#2 s/p rLTCS c/b preeclampsia with severe features and likely pregestational diabetes.  Patient is stable and doing well post-operatively.     #PEC with SF  - BP wnl overnight  - On Procardia 60mgXL.Severe range blood pressures 5/8, requiring Procardia 10 IR x2  - s/p Mg  - AST/ALT 38/17      #pregestational diabetes mellitus  - Pt learned she was pregnant 3 weeks prior, endorses history of GDMA2 and macrosomia in last pregnancy in [place] but did not take insulin after  - A1C 7.9  - on low dose insulin sliding scale   - continue fingersticks nightly and premeal  - Continue Carb consistent diet  - Endo: Add metformin 500mg bid. Outpatient endo, ophtho, pod followup      #postpartum care   - Continue regular diet.  - Increase ambulation.  - HSQ, venodynes for DVT prophylaxis  - Continue motrin, tylenol, oxycodone PRN for pain control  - Hct: 33.0->31.5    Franko Kothari, PGY1 A/P: 25yo on POD#2 s/p rLTCS c/b preeclampsia with severe features and likely pregestational diabetes.  Patient is stable and doing well post-operatively.     #PEC with SF  - BP wnl overnight  - On Procardia 60mgXL.Severe range blood pressures 5/8, requiring Procardia 10 IR x2  - s/p Mg  - AST/ALT 38/17      #pregestational diabetes mellitus  - Pt learned she was pregnant 3 weeks prior, endorses history of GDMA2 and macrosomia in last pregnancy in [place] but did not take insulin after  -  overnight  - A1C 7.9  - on low dose insulin sliding scale   - continue fingersticks nightly and premeal  - Continue Carb consistent diet  - Endo: Add metformin 500mg bid. Outpatient endo, ophtho, pod followup      #postpartum care   - Continue regular diet.  - Increase ambulation.  - HSQ, venodynes for DVT prophylaxis  - Continue motrin, tylenol, oxycodone PRN for pain control  - Hct: 33.0->31.5    Franko Kothari, PGY1

## 2024-05-09 NOTE — PROGRESS NOTE ADULT - SUBJECTIVE AND OBJECTIVE BOX
OB Progress Note: rLTCS, POD#2    S: 25yo on POD#2 s/p rLTCS at approx 36w6d c/b preeclampsia with severe features and likely pregestational diabetes. Pain is well controlled. She is tolerating a regular diet and passing flatus. She is voiding spontaneously, and ambulating without difficulty. Denies chest pain, shortness of breath. Denies headaches, blurry vision, epigastric pain, weakness. Denies nausea/vomiting.    O:  Vitals:  Vital Signs Last 24 Hrs  T(C): 37 (09 May 2024 02:35), Max: 37 (09 May 2024 02:35)  T(F): 98.6 (09 May 2024 02:35), Max: 98.6 (09 May 2024 02:35)  HR: 92 (09 May 2024 02:35) (92 - 103)  BP: 136/77 (09 May 2024 02:35) (120/74 - 136/77)  BP(mean): --  RR: 19 (09 May 2024 02:35) (18 - 19)  SpO2: 100% (09 May 2024 02:35) (98% - 100%)        MEDICATIONS  (STANDING):  acetaminophen     Tablet .. 975 milliGRAM(s) Oral <User Schedule>  acetaminophen   IVPB .. 1000 milliGRAM(s) IV Intermittent once  dextrose 10% Bolus 125 milliLiter(s) IV Bolus once  dextrose 5%. 1000 milliLiter(s) (50 mL/Hr) IV Continuous <Continuous>  dextrose 5%. 1000 milliLiter(s) (100 mL/Hr) IV Continuous <Continuous>  dextrose 50% Injectable 12.5 Gram(s) IV Push once  dextrose 50% Injectable 25 Gram(s) IV Push once  diphtheria/tetanus/pertussis (acellular) Vaccine (Adacel) 0.5 milliLiter(s) IntraMuscular once  ferrous    sulfate 325 milliGRAM(s) Oral daily  glucagon  Injectable 1 milliGRAM(s) IntraMuscular once  heparin   Injectable 5000 Unit(s) SubCutaneous every 12 hours  ibuprofen  Tablet. 600 milliGRAM(s) Oral every 6 hours  influenza   Vaccine 0.5 milliLiter(s) IntraMuscular once  insulin lispro (ADMELOG) corrective regimen sliding scale   SubCutaneous three times a day before meals  insulin lispro (ADMELOG) corrective regimen sliding scale   SubCutaneous at bedtime  lactated ringers. 1000 milliLiter(s) (50 mL/Hr) IV Continuous <Continuous>  metFORMIN 500 milliGRAM(s) Oral two times a day with meals  NIFEdipine XL 60 milliGRAM(s) Oral daily  prenatal multivitamin 1 Tablet(s) Oral daily  senna 2 Tablet(s) Oral at bedtime      MEDICATIONS  (PRN):  dextrose Oral Gel 15 Gram(s) Oral once PRN Blood Glucose LESS THAN 70 milliGRAM(s)/deciliter  diphenhydrAMINE 25 milliGRAM(s) Oral every 6 hours PRN Pruritus  lanolin Ointment 1 Application(s) Topical every 6 hours PRN Sore Nipples  magnesium hydroxide Suspension 30 milliLiter(s) Oral two times a day PRN Constipation  oxyCODONE    IR 5 milliGRAM(s) Oral once PRN Moderate to Severe Pain (4-10)  oxyCODONE    IR 5 milliGRAM(s) Oral every 3 hours PRN Moderate to Severe Pain (4-10)  simethicone 80 milliGRAM(s) Chew every 4 hours PRN Gas      Labs:  Blood type: A Positive  Rubella IgG: RPR: Negative                          10.0<L>   15.17<H> >-----------< 215    ( 05-08 @ 06:51 )             31.5<L>                        10.9<L>   11.65<H> >-----------< 199    ( 05-07 @ 11:40 )             33.0<L>    05-08-24 @ 06:51      131<L>  |  99  |  7   ----------------------------<  181<H>  4.6   |  17<L>  |  0.51    05-07-24 @ 11:40      134<L>  |  102  |  8   ----------------------------<  159<H>  4.1   |  16<L>  |  0.44<L>        Ca    7.7<L>      08 May 2024 06:51  Ca    8.7      07 May 2024 11:40  Mg     4.10<H>     05-08  Mg     4.20<H>     05-08  Mg     3.70<H>     05-08  Mg     3.60<H>     05-07    TPro  6.4  /  Alb  3.1<L>  /  TBili  0.6  /  DBili  x   /  AST  37<H>  /  ALT  14  /  AlkPhos  168<H>  05-08-24 @ 06:51  TPro  7.6  /  Alb  3.6  /  TBili  0.4  /  DBili  x   /  AST  38<H>  /  ALT  17  /  AlkPhos  177<H>  05-07-24 @ 11:40          PE:  General: NAD  Abdomen: Soft, appropriately tender, incision covered by Aquacel bandage  Extremities: No erythema, no pitting edema   OB Progress Note: rLTCS, POD#2    S: 23yo on POD#2 s/p rLTCS at approx 36w6d c/b preeclampsia with severe features and likely pregestational diabetes. Pain is well controlled. She is tolerating a regular diet. She is voiding spontaneously, and ambulating without difficulty. Denies chest pain, shortness of breath. Denies headaches, blurry vision, epigastric pain, weakness. Denies nausea/vomiting.    O:  Vitals:  Vital Signs Last 24 Hrs  T(C): 37 (09 May 2024 02:35), Max: 37 (09 May 2024 02:35)  T(F): 98.6 (09 May 2024 02:35), Max: 98.6 (09 May 2024 02:35)  HR: 92 (09 May 2024 02:35) (92 - 103)  BP: 136/77 (09 May 2024 02:35) (120/74 - 136/77)  BP(mean): --  RR: 19 (09 May 2024 02:35) (18 - 19)  SpO2: 100% (09 May 2024 02:35) (98% - 100%)        MEDICATIONS  (STANDING):  acetaminophen     Tablet .. 975 milliGRAM(s) Oral <User Schedule>  acetaminophen   IVPB .. 1000 milliGRAM(s) IV Intermittent once  dextrose 10% Bolus 125 milliLiter(s) IV Bolus once  dextrose 5%. 1000 milliLiter(s) (50 mL/Hr) IV Continuous <Continuous>  dextrose 5%. 1000 milliLiter(s) (100 mL/Hr) IV Continuous <Continuous>  dextrose 50% Injectable 12.5 Gram(s) IV Push once  dextrose 50% Injectable 25 Gram(s) IV Push once  diphtheria/tetanus/pertussis (acellular) Vaccine (Adacel) 0.5 milliLiter(s) IntraMuscular once  ferrous    sulfate 325 milliGRAM(s) Oral daily  glucagon  Injectable 1 milliGRAM(s) IntraMuscular once  heparin   Injectable 5000 Unit(s) SubCutaneous every 12 hours  ibuprofen  Tablet. 600 milliGRAM(s) Oral every 6 hours  influenza   Vaccine 0.5 milliLiter(s) IntraMuscular once  insulin lispro (ADMELOG) corrective regimen sliding scale   SubCutaneous three times a day before meals  insulin lispro (ADMELOG) corrective regimen sliding scale   SubCutaneous at bedtime  lactated ringers. 1000 milliLiter(s) (50 mL/Hr) IV Continuous <Continuous>  metFORMIN 500 milliGRAM(s) Oral two times a day with meals  NIFEdipine XL 60 milliGRAM(s) Oral daily  prenatal multivitamin 1 Tablet(s) Oral daily  senna 2 Tablet(s) Oral at bedtime      MEDICATIONS  (PRN):  dextrose Oral Gel 15 Gram(s) Oral once PRN Blood Glucose LESS THAN 70 milliGRAM(s)/deciliter  diphenhydrAMINE 25 milliGRAM(s) Oral every 6 hours PRN Pruritus  lanolin Ointment 1 Application(s) Topical every 6 hours PRN Sore Nipples  magnesium hydroxide Suspension 30 milliLiter(s) Oral two times a day PRN Constipation  oxyCODONE    IR 5 milliGRAM(s) Oral once PRN Moderate to Severe Pain (4-10)  oxyCODONE    IR 5 milliGRAM(s) Oral every 3 hours PRN Moderate to Severe Pain (4-10)  simethicone 80 milliGRAM(s) Chew every 4 hours PRN Gas      Labs:  Blood type: A Positive  Rubella IgG: RPR: Negative                          10.0<L>   15.17<H> >-----------< 215    ( 05-08 @ 06:51 )             31.5<L>                        10.9<L>   11.65<H> >-----------< 199    ( 05-07 @ 11:40 )             33.0<L>    05-08-24 @ 06:51      131<L>  |  99  |  7   ----------------------------<  181<H>  4.6   |  17<L>  |  0.51    05-07-24 @ 11:40      134<L>  |  102  |  8   ----------------------------<  159<H>  4.1   |  16<L>  |  0.44<L>        Ca    7.7<L>      08 May 2024 06:51  Ca    8.7      07 May 2024 11:40  Mg     4.10<H>     05-08  Mg     4.20<H>     05-08  Mg     3.70<H>     05-08  Mg     3.60<H>     05-07    TPro  6.4  /  Alb  3.1<L>  /  TBili  0.6  /  DBili  x   /  AST  37<H>  /  ALT  14  /  AlkPhos  168<H>  05-08-24 @ 06:51  TPro  7.6  /  Alb  3.6  /  TBili  0.4  /  DBili  x   /  AST  38<H>  /  ALT  17  /  AlkPhos  177<H>  05-07-24 @ 11:40          PE:  General: NAD  Abdomen: Soft, appropriately tender, incision covered by Aquacel bandage  Extremities: No erythema, no pitting edema

## 2024-05-10 VITALS
OXYGEN SATURATION: 100 % | RESPIRATION RATE: 18 BRPM | DIASTOLIC BLOOD PRESSURE: 76 MMHG | TEMPERATURE: 98 F | HEART RATE: 104 BPM | SYSTOLIC BLOOD PRESSURE: 132 MMHG

## 2024-05-10 PROBLEM — Z00.00 ENCOUNTER FOR PREVENTIVE HEALTH EXAMINATION: Status: ACTIVE | Noted: 2024-05-10

## 2024-05-10 LAB
HCT VFR BLD CALC: 28.4 % — LOW (ref 34.5–45)
HGB BLD-MCNC: 9 G/DL — LOW (ref 11.5–15.5)
MCHC RBC-ENTMCNC: 24.7 PG — LOW (ref 27–34)
MCHC RBC-ENTMCNC: 31.7 GM/DL — LOW (ref 32–36)
MCV RBC AUTO: 78 FL — LOW (ref 80–100)
NRBC # BLD: 0 /100 WBCS — SIGNIFICANT CHANGE UP (ref 0–0)
NRBC # FLD: 0 K/UL — SIGNIFICANT CHANGE UP (ref 0–0)
PLATELET # BLD AUTO: 214 K/UL — SIGNIFICANT CHANGE UP (ref 150–400)
RBC # BLD: 3.64 M/UL — LOW (ref 3.8–5.2)
RBC # FLD: 14.9 % — HIGH (ref 10.3–14.5)
WBC # BLD: 9.48 K/UL — SIGNIFICANT CHANGE UP (ref 3.8–10.5)
WBC # FLD AUTO: 9.48 K/UL — SIGNIFICANT CHANGE UP (ref 3.8–10.5)

## 2024-05-10 PROCEDURE — 99232 SBSQ HOSP IP/OBS MODERATE 35: CPT

## 2024-05-10 RX ADMIN — Medication 975 MILLIGRAM(S): at 15:19

## 2024-05-10 RX ADMIN — Medication 600 MILLIGRAM(S): at 17:54

## 2024-05-10 RX ADMIN — Medication 975 MILLIGRAM(S): at 08:07

## 2024-05-10 RX ADMIN — Medication 975 MILLIGRAM(S): at 15:49

## 2024-05-10 RX ADMIN — Medication 600 MILLIGRAM(S): at 07:15

## 2024-05-10 RX ADMIN — METFORMIN HYDROCHLORIDE 500 MILLIGRAM(S): 850 TABLET ORAL at 17:24

## 2024-05-10 RX ADMIN — Medication 600 MILLIGRAM(S): at 00:06

## 2024-05-10 RX ADMIN — Medication 600 MILLIGRAM(S): at 17:24

## 2024-05-10 RX ADMIN — Medication 975 MILLIGRAM(S): at 08:37

## 2024-05-10 RX ADMIN — Medication 60 MILLIGRAM(S): at 15:20

## 2024-05-10 RX ADMIN — HEPARIN SODIUM 5000 UNIT(S): 5000 INJECTION INTRAVENOUS; SUBCUTANEOUS at 06:17

## 2024-05-10 RX ADMIN — PIPERACILLIN AND TAZOBACTAM 200 GRAM(S): 4; .5 INJECTION, POWDER, LYOPHILIZED, FOR SOLUTION INTRAVENOUS at 06:16

## 2024-05-10 RX ADMIN — Medication 600 MILLIGRAM(S): at 12:41

## 2024-05-10 RX ADMIN — Medication 600 MILLIGRAM(S): at 01:04

## 2024-05-10 RX ADMIN — Medication 600 MILLIGRAM(S): at 12:11

## 2024-05-10 RX ADMIN — Medication 1 TABLET(S): at 15:20

## 2024-05-10 RX ADMIN — METFORMIN HYDROCHLORIDE 500 MILLIGRAM(S): 850 TABLET ORAL at 08:07

## 2024-05-10 RX ADMIN — Medication 600 MILLIGRAM(S): at 06:17

## 2024-05-10 NOTE — PROGRESS NOTE ADULT - SUBJECTIVE AND OBJECTIVE BOX
ID#  [id number] Progress Note    25yo on POD#3 s/p rLTCS at approx 36w6d c/b preeclampsia with severe features and likely pregestational diabetes. Patient seen and examined at bedside, no acute overnight events. No acute complaints, pain well controlled. Patient is ambulating, voiding, and tolerating regular diet. Passing flatus. Denies CP, SOB, N/V, HA, blurred vision, epigastric pain.    Vital Signs Last 24 Hours  T(C): 37.3 (05-10-24 @ 01:32), Max: 37.3 (05-10-24 @ 01:32)  HR: 99 (05-10-24 @ 01:32) (99 - 108)  BP: 134/75 (05-10-24 @ 01:32) (118/75 - 142/89)  RR: 18 (05-10-24 @ 01:32) (18 - 18)  SpO2: 100% (05-10-24 @ 01:32) (100% - 100%)    I&O's Summary    08 May 2024 07:01  -  09 May 2024 07:00  --------------------------------------------------------  IN: 400 mL / OUT: 4200 mL / NET: -3800 mL        Physical Exam:  General: NAD  Abdomen: Soft, non-tender, non-distended, fundus firm  Incision: Pfannenstiel incision CDI, subcuticular suture closure  Pelvic: Lochia wnl    Labs:    Blood Type: A Positive  Antibody Screen: --  RPR: Negative               10.0   15.17 )-----------( 215      ( 05-08 @ 06:51 )             31.5                10.9   11.65 )-----------( 199      ( 05-07 @ 11:40 )             33.0         MEDICATIONS  (STANDING):  acetaminophen     Tablet .. 975 milliGRAM(s) Oral <User Schedule>  acetaminophen   IVPB .. 1000 milliGRAM(s) IV Intermittent once  dextrose 10% Bolus 125 milliLiter(s) IV Bolus once  dextrose 5%. 1000 milliLiter(s) (100 mL/Hr) IV Continuous <Continuous>  dextrose 5%. 1000 milliLiter(s) (50 mL/Hr) IV Continuous <Continuous>  dextrose 50% Injectable 25 Gram(s) IV Push once  dextrose 50% Injectable 12.5 Gram(s) IV Push once  diphtheria/tetanus/pertussis (acellular) Vaccine (Adacel) 0.5 milliLiter(s) IntraMuscular once  ferrous    sulfate 325 milliGRAM(s) Oral daily  glucagon  Injectable 1 milliGRAM(s) IntraMuscular once  heparin   Injectable 5000 Unit(s) SubCutaneous every 12 hours  ibuprofen  Tablet. 600 milliGRAM(s) Oral every 6 hours  influenza   Vaccine 0.5 milliLiter(s) IntraMuscular once  insulin lispro (ADMELOG) corrective regimen sliding scale   SubCutaneous three times a day before meals  insulin lispro (ADMELOG) corrective regimen sliding scale   SubCutaneous at bedtime  metFORMIN 500 milliGRAM(s) Oral two times a day with meals  NIFEdipine XL 60 milliGRAM(s) Oral daily  piperacillin/tazobactam IVPB.. 4.5 Gram(s) IV Intermittent every 8 hours  prenatal multivitamin 1 Tablet(s) Oral daily  senna 2 Tablet(s) Oral at bedtime    MEDICATIONS  (PRN):  dextrose Oral Gel 15 Gram(s) Oral once PRN Blood Glucose LESS THAN 70 milliGRAM(s)/deciliter  diphenhydrAMINE 25 milliGRAM(s) Oral every 6 hours PRN Pruritus  lanolin Ointment 1 Application(s) Topical every 6 hours PRN Sore Nipples  magnesium hydroxide Suspension 30 milliLiter(s) Oral two times a day PRN Constipation  oxyCODONE    IR 5 milliGRAM(s) Oral every 3 hours PRN Moderate to Severe Pain (4-10)  oxyCODONE    IR 5 milliGRAM(s) Oral once PRN Moderate to Severe Pain (4-10)  simethicone 80 milliGRAM(s) Chew every 4 hours PRN Gas    ID#[id number]  R1 Progress Note    25yo on POD#3 s/p rLTCS at approx 36w6d c/b preeclampsia with severe features and likely pregestational diabetes. Patient seen and examined at bedside, no acute overnight events. No acute complaints, pain well controlled. Denies any further abdominal pain. Patient is ambulating, voiding, and tolerating regular diet. Passing flatus. Denies CP, SOB, N/V, HA, blurred vision, epigastric pain.    Vital Signs Last 24 Hours  T(C): 37.3 (05-10-24 @ 01:32), Max: 37.3 (05-10-24 @ 01:32)  HR: 99 (05-10-24 @ 01:32) (99 - 108)  BP: 134/75 (05-10-24 @ 01:32) (118/75 - 142/89)  RR: 18 (05-10-24 @ 01:32) (18 - 18)  SpO2: 100% (05-10-24 @ 01:32) (100% - 100%)    I&O's Summary    08 May 2024 07:01  -  09 May 2024 07:00  --------------------------------------------------------  IN: 400 mL / OUT: 4200 mL / NET: -3800 mL        Physical Exam:  General: NAD  Abdomen: Soft, non-tender, non-distended, fundus firm  Incision: Pfannenstiel incision CDI, subcuticular suture closure, aquacel dressing   Pelvic: Lochia wnl, no fundal tenderness on exam     Labs:    Blood Type: A Positive  Antibody Screen: --  RPR: Negative               10.0   15.17 )-----------( 215      ( 05-08 @ 06:51 )             31.5                10.9   11.65 )-----------( 199      ( 05-07 @ 11:40 )             33.0         MEDICATIONS  (STANDING):  acetaminophen     Tablet .. 975 milliGRAM(s) Oral <User Schedule>  acetaminophen   IVPB .. 1000 milliGRAM(s) IV Intermittent once  dextrose 10% Bolus 125 milliLiter(s) IV Bolus once  dextrose 5%. 1000 milliLiter(s) (100 mL/Hr) IV Continuous <Continuous>  dextrose 5%. 1000 milliLiter(s) (50 mL/Hr) IV Continuous <Continuous>  dextrose 50% Injectable 25 Gram(s) IV Push once  dextrose 50% Injectable 12.5 Gram(s) IV Push once  diphtheria/tetanus/pertussis (acellular) Vaccine (Adacel) 0.5 milliLiter(s) IntraMuscular once  ferrous    sulfate 325 milliGRAM(s) Oral daily  glucagon  Injectable 1 milliGRAM(s) IntraMuscular once  heparin   Injectable 5000 Unit(s) SubCutaneous every 12 hours  ibuprofen  Tablet. 600 milliGRAM(s) Oral every 6 hours  influenza   Vaccine 0.5 milliLiter(s) IntraMuscular once  insulin lispro (ADMELOG) corrective regimen sliding scale   SubCutaneous three times a day before meals  insulin lispro (ADMELOG) corrective regimen sliding scale   SubCutaneous at bedtime  metFORMIN 500 milliGRAM(s) Oral two times a day with meals  NIFEdipine XL 60 milliGRAM(s) Oral daily  piperacillin/tazobactam IVPB.. 4.5 Gram(s) IV Intermittent every 8 hours  prenatal multivitamin 1 Tablet(s) Oral daily  senna 2 Tablet(s) Oral at bedtime    MEDICATIONS  (PRN):  dextrose Oral Gel 15 Gram(s) Oral once PRN Blood Glucose LESS THAN 70 milliGRAM(s)/deciliter  diphenhydrAMINE 25 milliGRAM(s) Oral every 6 hours PRN Pruritus  lanolin Ointment 1 Application(s) Topical every 6 hours PRN Sore Nipples  magnesium hydroxide Suspension 30 milliLiter(s) Oral two times a day PRN Constipation  oxyCODONE    IR 5 milliGRAM(s) Oral every 3 hours PRN Moderate to Severe Pain (4-10)  oxyCODONE    IR 5 milliGRAM(s) Oral once PRN Moderate to Severe Pain (4-10)  simethicone 80 milliGRAM(s) Chew every 4 hours PRN Gas

## 2024-05-10 NOTE — PROGRESS NOTE ADULT - ASSESSMENT
A/P: 25yo on POD#3 s/p rLTCS c/b preeclampsia with severe features and likely pregestational diabetes.  Patient is stable and doing well post-operatively.     #PEC with SF  - BP wnl overnight  - On Procardia 60mgXL. Severe range blood pressures 5/8, requiring Procardia 10 IR x2  - s/p Mg  - AST/ALT 38/17    #pregestational diabetes mellitus  - Pt learned she was pregnant 3 weeks prior, endorses history of GDMA2 and macrosomia in last pregnancy in [place] but did not take insulin after  - FS 97 overnight  - A1C 7.9  - on low dose insulin sliding scale   - continue fingersticks nightly and premeal  - Continue Carb consistent diet  - Endo: Add metformin 500mg bid. Outpatient endo, ophtho, pod followup    #postpartum care   - Continue regular diet.  - Increase ambulation.  - HSQ, venodynes for DVT prophylaxis  - Continue motrin, tylenol, oxycodone PRN for pain control  - Hct: 33.0->31.5    Aisha Dennis PGY-1    A/P: 23yo on POD#3 s/p rLTCS c/b preeclampsia with severe features and likely pregestational diabetes.  Patient is stable and doing well post-operatively.     #Endometritis  - Diagnosed POD#2 after significant uterine tenderness noted with leukocytosis  - Pt on Zosyn x24h  - No signs of continued infection on PE   - WBC 11.65->15.17->9.48    #PEC with SF  - BP wnl overnight  - On Procardia 60mgXL. Severe range blood pressures 5/8, requiring Procardia 10 IR x2  - s/p Mg  - AST/ALT 38/17    #pregestational diabetes mellitus  - Pt learned she was pregnant 3 weeks prior, endorses history of GDMA2 and macrosomia in last pregnancy in [place] but did not take insulin after  - FS 97 overnight  - A1C 7.9  - on low dose insulin sliding scale   - continue fingersticks nightly and premeal  - Continue Carb consistent diet  - Endo: Add metformin 500mg bid. Outpatient endo, ophtho, pod followup    #postpartum care   - Continue regular diet.  - Increase ambulation.  - HSQ, venodynes for DVT prophylaxis  - Continue motrin, tylenol, oxycodone PRN for pain control  - Hct: 33.0->31.5  - Discharge planning     Aisha Dennis PGY-1

## 2024-05-10 NOTE — PROGRESS NOTE ADULT - ATTENDING COMMENTS
[facility]ate [facility] of L [facility] D ( late entry)     # [id number]    OB Progress Note:  Delivery, POD#3    S: 25yo POD#3 s/p vaccuum assisted R-LTCS.  patient denies any complaints today    O:   Vital Signs Last 24 Hrs  T(C): 36.9 (10 May 2024 10:00), Max: 37.3 (10 May 2024 01:32)  T(F): 98.4 (10 May 2024 10:00), Max: 99.2 (10 May 2024 01:32)  HR: 98 (10 May 2024 10:00) (97 - 108)  BP: 136/68 (10 May 2024 10:00) (118/75 - 142/89)  RR: 18 (10 May 2024 10:00) (18 - 18)  SpO2: 100% (10 May 2024 10:00) (100% - 100%)    Parameters below as of 10 May 2024 10:00  Patient On (Oxygen Delivery Method): room air            Labs:  Blood type: A Positive  Rubella IgG: RPR: Negative                        10.0<L>   15.17<H> >-----------< 215    (  @ 06:51 )             31.5<L>                        10.9<L>   11.65<H> >-----------< 199    (  @ 11:40 )             33.0<L>    24 @ 06:51      131<L>  |  99  |  7   ----------------------------<  181<H>  4.6   |  17<L>  |  0.51    24 @ 11:40      134<L>  |  102  |  8   ----------------------------<  159<H>  4.1   |  16<L>  |  0.44<L>      CAPILLARY BLOOD GLUCOSE  POCT Blood Glucose.: 87 mg/dL (10 May 2024 12:16)  POCT Blood Glucose.: 93 mg/dL (10 May 2024 08:36)  POCT Blood Glucose.: 89 mg/dL (10 May 2024 08:31)  POCT Blood Glucose.: 97 mg/dL (09 May 2024 21:54)  POCT Blood Glucose.: 111 mg/dL (09 May 2024 16:38)      Abdomen: soft, fundus firm Mildly distended, tender 3/10  incision aqual cell  Extremities: No erythema, trace edema    A/P: 25yo POD#3 s/p vaccuum assisted R-LTCS complicated by PEC with severe features currently on  procardia 60 mg and poorly controlled DM Probably Type II (  based on the FS and the HgA1C 7.9 and baby who AC was measuring 5 weeks greater than the gestation age) and  leukocytosis and improved  uterine tenderness    - Patient is stable for discharge and will follow up at the [facility] clinic  - Monitor BP's closely  - Procardia 60 mg XL  - metformin 500mg BID x 7 days and up titrate to 1000mg BID  - Aqual cell dressing  to be removed Monday or Tuesday  - I answered all questions of the patient, mother had no questions    Nguyen Andujar M.D., M.B.A., M.S.
Associate Chief of L [facility] D ( late entry)     # [id number]  OB Progress Note:  Delivery, POD#2    S: 25yo POD#2 s/p vaccuum assisted R-LTCS.  patient denies any complaints     O:   Vital Signs Last 24 Hrs  T(C): 36.7 (09 May 2024 09:47), Max: 37.1 (09 May 2024 06:00)  T(F): 98.1 (09 May 2024 09:47), Max: 98.7 (09 May 2024 06:00)  HR: 105 (09 May 2024 09:47) (92 - 105)  BP: 131/64 (09 May 2024 09:47) (120/74 - 136/77)  RR: 18 (09 May 2024 09:47) (18 - 19)  SpO2: 100% (09 May 2024 09:47) (100% - 100%)    Parameters below as of 09 May 2024 09:47  Patient On (Oxygen Delivery Method): room air        Labs:  Blood type: A Positive  Rubella IgG: RPR: Negative                        10.0<L>   15.17<H> >-----------< 215    (  @ 06:51 )             31.5<L>                        10.9<L>   11.65<H> >-----------< 199    (  @ 11:40 )             33.0<L>    24 @ 06:51      131<L>  |  99  |  7   ----------------------------<  181<H>  4.6   |  17<L>  |  0.51    24 @ 11:40      134<L>  |  102  |  8   ----------------------------<  159<H>  4.1   |  16<L>  |  0.44<L>      CAPILLARY BLOOD GLUCOSE  POCT Blood Glucose.: 124 mg/dL (09 May 2024 08:01)  POCT Blood Glucose.: 133 mg/dL (08 May 2024 22:01)  POCT Blood Glucose.: 139 mg/dL (08 May 2024 17:08)  POCT Blood Glucose.: 140 mg/dL (08 May 2024 11:54)    Abdomen: soft, fundus firm Mildly distended, tender 9/10  incision aqual cell  Extremities: No erythema, trace edema    A/P: 25yo POD#2 s/p vaccuum assisted R-LTCS complicated by PEC with severe features currently on  procardia 60 mg and poorly controlled DM Probably Type II (  based on the FS and the HgA1C 7.9 and baby who AC was measuring 5 weeks greater than the gestation age) and  leukocytosis and marked uterine tenderness    - Continue regular diet.  - Increase ambulation.  - Continue Motrin, Tylenol, oxycodone PRN for pain control.  vs. continue PCEA for pain.  - Monitor BP's closely  - Procardia 60 mg XL  - Endocrine consult appreciated   -  metformin 500mg BID and continue with sliding scale insulin  - Aqual cell dressing  replaced  - Zosyn x 24 hours for leukocytosis and marked uterine tenderness  - I answered all questions of the patient, mother had no questions    Nguyen Andujar M.D., M.B.A., M.S.
Associate Chief [facility] ( late entry)     # [id number]     I have met this patient for the first time today.  She was admitted by Dr Escalante and delivered by Dr Khari bey.  Upon review of the chart and the use of the   the patient reported that she did require insulin and HTN meds in prior pregnancy and when told to half the doses she felt she did not need it any further.  Patient also just arrived from [place] 2-3 weeks ago and stated that she did not know she was pregnancy.    OB Progress Note:  Delivery, POD#1    S: 23yo POD#1 s/p vaccuum assisted R-LTCS.  patient denies any complaints     O:   Vital Signs Last 24 Hrs  T(C): 36.6 (08 May 2024 07:45), Max: 36.6 (07 May 2024 22:39)  T(F): 97.9 (08 May 2024 07:45), Max: 97.9 (07 May 2024 22:39)  HR: 100 (08 May 2024 11:45) (83 - 106)  BP: 120/74 (08 May 2024 11:45) (120/74 - 160/91)  BP(mean): 94 (07 May 2024 21:00) (86 - 109)  RR: 18 (08 May 2024 11:45) (15 - 19)  SpO2: 100% (08 May 2024 11:45) (98% - 100%)        Labs:  Blood type: A Positive  Rubella IgG: RPR: Negative                          10.0<L>   15.17<H> >-----------< 215    (  @ 06:51 )             31.5<L>                        10.9<L>   11.65<H> >-----------< 199    (  @ 11:40 )             33.0<L>    24 @ 06:51      131<L>  |  99  |  7   ----------------------------<  181<H>  4.6   |  17<L>  |  0.51    24 @ 11:40      134<L>  |  102  |  8   ----------------------------<  159<H>  4.1   |  16<L>  |  0.44<L>        Ca    7.7<L>      08 May 2024 06:51  Ca    8.7      07 May 2024 11:40  Mg     4.10<H>     05-08  Mg     4.20<H>     05-08  Mg     3.70<H>     05-08  Mg     3.60<H>     05-07    TPro  6.4  /  Alb  3.1<L>  /  TBili  0.6  /  DBili  x   /  AST  37<H>  /  ALT  14  /  AlkPhos  168<H>  24 @ 06:51  TPro  7.6  /  Alb  3.6  /  TBili  0.4  /  DBili  x   /  AST  38<H>  /  ALT  17  /  AlkPhos  177<H>  24 @ 11:40    CAPILLARY BLOOD GLUCOSE  POCT Blood Glucose.: 140 mg/dL (08 May 2024 11:54)  POCT Blood Glucose.: 199 mg/dL (08 May 2024 07:31)  POCT Blood Glucose.: 196 mg/dL (08 May 2024 03:54)  POCT Blood Glucose.: 86 mg/dL (07 May 2024 17:25)  PE:    Abdomen: Mildly distended, appropriately tender  incision aqual cell  Extremities: No erythema, no pitting edema    A/P: 23yo POD#1 s/p vaccuum assisted R-LTCS complicated by PEC with severe features currently on Magnesium and procardia 60 mg and poorly controlled DM unsure if Type II vs gestational diabetes( however based on the FS and the HgA1C 7.9 and baby who AC was measuring @ weeks greater would suspect preexisting)    - Continue regular diet.  - Increase ambulation.  - Continue motrin, tylenol, oxycodone PRN for pain control.  vs. continue PCEA for pain.  - F/u AM CBC  - Monitor BP's closely  - Continue magnesium for 24 hours post delivery  - Procardia 60 mg XL  - Endocrine consult  - FS continue sliding scale and agree with endocrines recommendation to give metformin 500mg BID and continue with sliding scale insulin  - Aqual cell dressing to be replaced  - I answered all questions of the patient and her mother    Nguyen Andujar M.D., M.B.A., M.S.

## 2024-05-10 NOTE — PROGRESS NOTE ADULT - SUBJECTIVE AND OBJECTIVE BOX
Chief Complaint: GDM    Interval Events: Spoke with patient via  ID#[id number]. FS tightly controlled. Good appetite. Currently breastfeeding/pumping. No N/V or abdominal pain.     MEDICATIONS  (STANDING):  acetaminophen     Tablet .. 975 milliGRAM(s) Oral <User Schedule>  acetaminophen   IVPB .. 1000 milliGRAM(s) IV Intermittent once  dextrose 10% Bolus 125 milliLiter(s) IV Bolus once  dextrose 5%. 1000 milliLiter(s) (100 mL/Hr) IV Continuous <Continuous>  dextrose 5%. 1000 milliLiter(s) (50 mL/Hr) IV Continuous <Continuous>  dextrose 50% Injectable 25 Gram(s) IV Push once  dextrose 50% Injectable 12.5 Gram(s) IV Push once  diphtheria/tetanus/pertussis (acellular) Vaccine (Adacel) 0.5 milliLiter(s) IntraMuscular once  ferrous    sulfate 325 milliGRAM(s) Oral daily  glucagon  Injectable 1 milliGRAM(s) IntraMuscular once  heparin   Injectable 5000 Unit(s) SubCutaneous every 12 hours  ibuprofen  Tablet. 600 milliGRAM(s) Oral every 6 hours  influenza   Vaccine 0.5 milliLiter(s) IntraMuscular once  insulin lispro (ADMELOG) corrective regimen sliding scale   SubCutaneous three times a day before meals  insulin lispro (ADMELOG) corrective regimen sliding scale   SubCutaneous at bedtime  metFORMIN 500 milliGRAM(s) Oral two times a day with meals  NIFEdipine XL 60 milliGRAM(s) Oral daily  prenatal multivitamin 1 Tablet(s) Oral daily  senna 2 Tablet(s) Oral at bedtime    MEDICATIONS  (PRN):  dextrose Oral Gel 15 Gram(s) Oral once PRN Blood Glucose LESS THAN 70 milliGRAM(s)/deciliter  diphenhydrAMINE 25 milliGRAM(s) Oral every 6 hours PRN Pruritus  lanolin Ointment 1 Application(s) Topical every 6 hours PRN Sore Nipples  magnesium hydroxide Suspension 30 milliLiter(s) Oral two times a day PRN Constipation  oxyCODONE    IR 5 milliGRAM(s) Oral every 3 hours PRN Moderate to Severe Pain (4-10)  oxyCODONE    IR 5 milliGRAM(s) Oral once PRN Moderate to Severe Pain (4-10)  simethicone 80 milliGRAM(s) Chew every 4 hours PRN Gas    Allergies  No Known Allergies    Review of Systems:  Constitutional: No fever/chills   Eyes: No blurry vision  Neuro: No tremors  HEENT: No pain  Cardiovascular: No chest pain, no palpitations  Respiratory: No SOB, no cough  GI: No nausea, vomiting or abdominal pain  : No dysuria  Psych: no depression  Endocrine: no polyuria, polydipsia  Hem/lymph: no swelling  Osteoporosis: no fractures    VITALS: T(C): 36.9 (05-10-24 @ 10:00)  T(F): 98.4 (05-10-24 @ 10:00), Max: 99.2 (05-10-24 @ 01:32)  HR: 98 (05-10-24 @ 10:00) (97 - 108)  BP: 136/68 (05-10-24 @ 10:00) (118/75 - 142/89)  RR:  (18 - 18)  SpO2:  (100% - 100%)    Physical Exam:   GENERAL: NAD, well-groomed, well-developed  EYES: No proptosis, no lid lag, anicteric  HEENT:  Atraumatic, Normocephalic, moist mucous membranes  RESPIRATORY: non labored breathing   GI: Soft, nontender, non distended  MUSCULOSKELETAL: Full range of motion, normal strength  NEURO: extraocular movements intact, no tremor  PSYCH: Alert and oriented x 3, normal affect, normal mood    CAPILLARY BLOOD GLUCOSE  POCT Blood Glucose.: 87 mg/dL (10 May 2024 12:16)  POCT Blood Glucose.: 93 mg/dL (10 May 2024 08:36)  POCT Blood Glucose.: 89 mg/dL (10 May 2024 08:31)  POCT Blood Glucose.: 97 mg/dL (09 May 2024 21:54)  POCT Blood Glucose.: 111 mg/dL (09 May 2024 16:38)      05-08    131<L>  |  99  |  7   ----------------------------<  181<H>  4.6   |  17<L>  |  0.51    eGFR: 134    Ca    7.7<L>      05-08  Mg     4.10     05-08    TPro  6.4  /  Alb  3.1<L>  /  TBili  0.6  /  DBili  x   /  AST  37<H>  /  ALT  14  /  AlkPhos  168<H>  05-08      A1C with Estimated Average Glucose Result: 7.9 % (05-07-24 @ 11:40)

## 2024-05-10 NOTE — PROGRESS NOTE ADULT - ASSESSMENT
24 year old  who presented at around 36.6 weeks (questionable GA), hx of gestational DM, suspected to have preeclampsia now s/p  on .  Endocrine consulted for gestational vs pregestational diabetes, postpartum.    Gestational vs. Pregestational T2DM  Hx of gestational diabetes during her first pregnancy in ; she was on a mixed insulin 70/30 6 units in the AM and 24 units in the evening. Was told 2 weeks postpartum that she does not have diabetes and was not continued on any medications.    Given BG range and baby was large for gestational age suggests uncontrolled diabetes during pregnancy. Patient was not taking any medications for diabetes during pregnancy. She denies polydipsia, polyuria, blurry vision, neuropathy during pregnancy.  A1c: 7.9% on admission (unreliable in pregnancy)  Strong family hx of T2DM.      Inpatient plan  - FS goal 100-180 mg/dl   - FS tightly controlled  - continue metformin 500mg BID with meals (started 05/08 evening)  - continue low Admelog correctional scale TID AC  - continue separate low Admelog correctional scale at HS   - FS TID AC & HS ---> q6 if NPO   - hypoglycemia protocol PRN   - consistent carbohydrate diet      Discharge plan  - discharge on: metformin 500 mg PO BID for 7 days then uptitrate to 1000mg BID after 1 week (metformin was started  in the evening).  - Please send script for glucometer and supplies: test strips, lancets, alcohol swabs.  - Patinet should check her FS in the mornings prior to eating and keep track for her outpt provider follow up appt.   - Patient has no plans for future pregnancy. She wants to start OCP or patch for birth control. She plans to breastfeed. Once no longer breastfeeding, can consider GLP1 agonist outpatient for benefit of weight loss and DM control.  - Insurance: Medicaid  - Endocrine follow up: [facility]: [id number] [place] [address]; Ph # [phone]  - Routine ophthalmology and podiatry follow up      HTN  - goal <130/80  - check urine albumin/cr ratio outpatient  - management by primary team    HLD  - check lipid panel outpatient        RAMIRO Patricia-BC  Nurse Practitioner  Division of Endocrinology & Diabetes  pager [phone]

## 2024-05-13 ENCOUNTER — NON-APPOINTMENT (OUTPATIENT)
Age: 24
End: 2024-05-13

## 2024-05-13 ENCOUNTER — APPOINTMENT (OUTPATIENT)
Dept: OBGYN | Facility: HOSPITAL | Age: 24
End: 2024-05-13

## 2024-06-05 LAB — SURGICAL PATHOLOGY STUDY: SIGNIFICANT CHANGE UP
